# Patient Record
Sex: MALE | Race: WHITE | NOT HISPANIC OR LATINO | Employment: OTHER | URBAN - METROPOLITAN AREA
[De-identification: names, ages, dates, MRNs, and addresses within clinical notes are randomized per-mention and may not be internally consistent; named-entity substitution may affect disease eponyms.]

---

## 2018-05-18 DIAGNOSIS — E11.9 DM (DIABETES MELLITUS): ICD-10-CM

## 2018-05-18 DIAGNOSIS — R80.9 PROTEINURIA: ICD-10-CM

## 2018-05-18 DIAGNOSIS — I10 HTN (HYPERTENSION): ICD-10-CM

## 2018-05-18 DIAGNOSIS — I50.9 HEART FAILURE: ICD-10-CM

## 2018-05-18 DIAGNOSIS — I25.10 CAD IN NATIVE ARTERY: ICD-10-CM

## 2018-05-18 DIAGNOSIS — N17.9 AKI (ACUTE KIDNEY INJURY): ICD-10-CM

## 2018-05-18 DIAGNOSIS — N18.30 CKD (CHRONIC KIDNEY DISEASE), STAGE III: ICD-10-CM

## 2018-05-18 DIAGNOSIS — R35.89 FREQUENCY OF URINATION AND POLYURIA: ICD-10-CM

## 2018-05-18 DIAGNOSIS — E86.0 DEHYDRATION: ICD-10-CM

## 2018-05-18 DIAGNOSIS — R35.0 FREQUENCY OF URINATION AND POLYURIA: ICD-10-CM

## 2018-05-18 DIAGNOSIS — M19.90 DJD (DEGENERATIVE JOINT DISEASE): Primary | ICD-10-CM

## 2018-05-25 ENCOUNTER — HOSPITAL ENCOUNTER (OUTPATIENT)
Dept: RADIOLOGY | Facility: HOSPITAL | Age: 73
Discharge: HOME | End: 2018-05-25
Attending: INTERNAL MEDICINE
Payer: MEDICARE

## 2018-05-25 DIAGNOSIS — I50.9 HEART FAILURE: ICD-10-CM

## 2018-05-25 DIAGNOSIS — N18.30 CKD (CHRONIC KIDNEY DISEASE), STAGE III: ICD-10-CM

## 2018-05-25 DIAGNOSIS — E86.0 DEHYDRATION: ICD-10-CM

## 2018-05-25 DIAGNOSIS — I25.10 CAD IN NATIVE ARTERY: ICD-10-CM

## 2018-05-25 DIAGNOSIS — I10 HTN (HYPERTENSION): ICD-10-CM

## 2018-05-25 DIAGNOSIS — N17.9 AKI (ACUTE KIDNEY INJURY): ICD-10-CM

## 2018-05-25 DIAGNOSIS — R80.9 PROTEINURIA: ICD-10-CM

## 2018-05-25 DIAGNOSIS — E11.9 DM (DIABETES MELLITUS): ICD-10-CM

## 2018-05-25 DIAGNOSIS — M19.90 DJD (DEGENERATIVE JOINT DISEASE): ICD-10-CM

## 2018-05-25 DIAGNOSIS — R35.89 FREQUENCY OF URINATION AND POLYURIA: ICD-10-CM

## 2018-05-25 DIAGNOSIS — R35.0 FREQUENCY OF URINATION AND POLYURIA: ICD-10-CM

## 2018-05-25 PROCEDURE — 76857 US EXAM PELVIC LIMITED: CPT | Mod: TC

## 2018-05-25 PROCEDURE — 76857 US EXAM PELVIC LIMITED: CPT | Mod: 26,,, | Performed by: RADIOLOGY

## 2019-01-04 ENCOUNTER — TELEPHONE (OUTPATIENT)
Dept: PRIMARY CARE CLINIC | Facility: CLINIC | Age: 74
End: 2019-01-04

## 2019-01-04 NOTE — TELEPHONE ENCOUNTER
Nurse called and left message to please arrive for 130pm, appointment moved up to 145. Please call if have any questions.

## 2019-01-07 ENCOUNTER — TELEPHONE (OUTPATIENT)
Dept: PRIMARY CARE CLINIC | Facility: CLINIC | Age: 74
End: 2019-01-07

## 2019-01-07 NOTE — TELEPHONE ENCOUNTER
----- Message from Izzy Rodriguez sent at 1/7/2019 11:24 AM CST -----  Contact: Cholo robles/[facility] [phone]  He is requesting an appt with you for tomorrow morning.  He has a UTI and is trying to prevent going to the ER.  Please call.  Thank you!

## 2019-01-07 NOTE — TELEPHONE ENCOUNTER
Appointment made to see Ailyn on Wednesday. Cholo notified to bring the patient to the ER if he gets any worse.

## 2019-01-21 ENCOUNTER — TELEPHONE (OUTPATIENT)
Dept: PRIMARY CARE CLINIC | Facility: CLINIC | Age: 74
End: 2019-01-21

## 2019-01-21 ENCOUNTER — OFFICE VISIT (OUTPATIENT)
Dept: PRIMARY CARE CLINIC | Facility: CLINIC | Age: 74
End: 2019-01-21
Payer: MEDICARE

## 2019-01-21 VITALS
WEIGHT: 158 LBS | RESPIRATION RATE: 18 BRPM | TEMPERATURE: 98 F | HEIGHT: 68 IN | BODY MASS INDEX: 23.95 KG/M2 | SYSTOLIC BLOOD PRESSURE: 76 MMHG | OXYGEN SATURATION: 98 % | DIASTOLIC BLOOD PRESSURE: 50 MMHG | HEART RATE: 97 BPM

## 2019-01-21 DIAGNOSIS — N13.8 BENIGN PROSTATIC HYPERPLASIA WITH URINARY OBSTRUCTION: ICD-10-CM

## 2019-01-21 DIAGNOSIS — E11.22 TYPE 2 DIABETES MELLITUS WITH STAGE 3 CHRONIC KIDNEY DISEASE, WITH LONG-TERM CURRENT USE OF INSULIN: ICD-10-CM

## 2019-01-21 DIAGNOSIS — E78.5 HYPERLIPIDEMIA, UNSPECIFIED HYPERLIPIDEMIA TYPE: ICD-10-CM

## 2019-01-21 DIAGNOSIS — Z79.4 TYPE 2 DIABETES MELLITUS WITH STAGE 3 CHRONIC KIDNEY DISEASE, WITH LONG-TERM CURRENT USE OF INSULIN: ICD-10-CM

## 2019-01-21 DIAGNOSIS — R19.7 DIARRHEA, UNSPECIFIED TYPE: Primary | ICD-10-CM

## 2019-01-21 DIAGNOSIS — Z91.199 HISTORY OF NONCOMPLIANCE WITH MEDICAL TREATMENT: ICD-10-CM

## 2019-01-21 DIAGNOSIS — Z97.8 CHRONIC INDWELLING FOLEY CATHETER: ICD-10-CM

## 2019-01-21 DIAGNOSIS — G89.21 CHRONIC PAIN DUE TO TRAUMA: ICD-10-CM

## 2019-01-21 DIAGNOSIS — I73.9 PAD (PERIPHERAL ARTERY DISEASE): ICD-10-CM

## 2019-01-21 DIAGNOSIS — N40.1 BENIGN PROSTATIC HYPERPLASIA WITH URINARY OBSTRUCTION: ICD-10-CM

## 2019-01-21 DIAGNOSIS — F32.1 CURRENT MODERATE EPISODE OF MAJOR DEPRESSIVE DISORDER WITHOUT PRIOR EPISODE: ICD-10-CM

## 2019-01-21 DIAGNOSIS — N18.30 TYPE 2 DIABETES MELLITUS WITH STAGE 3 CHRONIC KIDNEY DISEASE, WITH LONG-TERM CURRENT USE OF INSULIN: ICD-10-CM

## 2019-01-21 DIAGNOSIS — S98.111A AMPUTATED GREAT TOE OF RIGHT FOOT: ICD-10-CM

## 2019-01-21 DIAGNOSIS — Z89.511 S/P BKA (BELOW KNEE AMPUTATION), RIGHT: ICD-10-CM

## 2019-01-21 DIAGNOSIS — N18.30 CHRONIC RENAL DISEASE, STAGE III: ICD-10-CM

## 2019-01-21 DIAGNOSIS — I25.10 CORONARY ARTERY DISEASE INVOLVING NATIVE CORONARY ARTERY OF NATIVE HEART, ANGINA PRESENCE UNSPECIFIED: ICD-10-CM

## 2019-01-21 DIAGNOSIS — F03.90 DEMENTIA WITHOUT BEHAVIORAL DISTURBANCE, UNSPECIFIED DEMENTIA TYPE: ICD-10-CM

## 2019-01-21 DIAGNOSIS — Z87.891 FORMER SMOKER: ICD-10-CM

## 2019-01-21 PROCEDURE — 99213 OFFICE O/P EST LOW 20 MIN: CPT | Mod: PBBFAC,PN | Performed by: FAMILY MEDICINE

## 2019-01-21 PROCEDURE — 99999 PR PBB SHADOW E&M-EST. PATIENT-LVL III: CPT | Mod: PBBFAC,,, | Performed by: FAMILY MEDICINE

## 2019-01-21 PROCEDURE — 99204 PR OFFICE/OUTPT VISIT, NEW, LEVL IV, 45-59 MIN: ICD-10-PCS | Mod: S$PBB,,, | Performed by: FAMILY MEDICINE

## 2019-01-21 PROCEDURE — 99204 OFFICE O/P NEW MOD 45 MIN: CPT | Mod: S$PBB,,, | Performed by: FAMILY MEDICINE

## 2019-01-21 PROCEDURE — 99999 PR PBB SHADOW E&M-EST. PATIENT-LVL III: ICD-10-PCS | Mod: PBBFAC,,, | Performed by: FAMILY MEDICINE

## 2019-01-21 RX ORDER — TAMSULOSIN HYDROCHLORIDE 0.4 MG/1
0.4 CAPSULE ORAL DAILY
Qty: 90 CAPSULE | Refills: 1 | Status: SHIPPED | OUTPATIENT
Start: 2019-01-21 | End: 2019-04-29 | Stop reason: SDUPTHER

## 2019-01-21 RX ORDER — PRASUGREL 10 MG/1
10 TABLET, FILM COATED ORAL DAILY
COMMUNITY
End: 2019-01-21 | Stop reason: SDUPTHER

## 2019-01-21 RX ORDER — FINASTERIDE 5 MG/1
5 TABLET, FILM COATED ORAL DAILY
Qty: 90 TABLET | Refills: 1 | Status: SHIPPED | OUTPATIENT
Start: 2019-01-21 | End: 2019-05-06 | Stop reason: SDUPTHER

## 2019-01-21 RX ORDER — PRAVASTATIN SODIUM 40 MG/1
40 TABLET ORAL DAILY
COMMUNITY
End: 2019-01-21 | Stop reason: SDUPTHER

## 2019-01-21 RX ORDER — TAMSULOSIN HYDROCHLORIDE 0.4 MG/1
0.4 CAPSULE ORAL DAILY
COMMUNITY
End: 2019-01-21 | Stop reason: SDUPTHER

## 2019-01-21 RX ORDER — ASPIRIN 81 MG/1
81 TABLET ORAL DAILY
Qty: 90 TABLET | Refills: 1 | Status: SHIPPED | OUTPATIENT
Start: 2019-01-21 | End: 2021-02-26 | Stop reason: SDUPTHER

## 2019-01-21 RX ORDER — CYCLOBENZAPRINE HCL 5 MG
10 TABLET ORAL NIGHTLY
COMMUNITY
End: 2019-05-22

## 2019-01-21 RX ORDER — NYSTATIN 100000 U/G
CREAM TOPICAL 2 TIMES DAILY
Qty: 30 G | Refills: 2 | Status: SHIPPED | OUTPATIENT
Start: 2019-01-21 | End: 2020-08-12

## 2019-01-21 RX ORDER — FINASTERIDE 5 MG/1
5 TABLET, FILM COATED ORAL DAILY
COMMUNITY
End: 2019-01-21 | Stop reason: SDUPTHER

## 2019-01-21 RX ORDER — ASPIRIN 81 MG/1
81 TABLET ORAL DAILY
COMMUNITY
End: 2019-01-21 | Stop reason: SDUPTHER

## 2019-01-21 RX ORDER — QUETIAPINE FUMARATE 25 MG/1
1 TABLET, FILM COATED ORAL NIGHTLY
COMMUNITY
Start: 2018-12-14 | End: 2019-01-21 | Stop reason: SDUPTHER

## 2019-01-21 RX ORDER — DONEPEZIL HYDROCHLORIDE 5 MG/1
5 TABLET, FILM COATED ORAL NIGHTLY
Qty: 90 TABLET | Refills: 1 | Status: SHIPPED | OUTPATIENT
Start: 2019-01-21 | End: 2019-05-22

## 2019-01-21 RX ORDER — PRASUGREL 10 MG/1
10 TABLET, FILM COATED ORAL DAILY
Qty: 90 TABLET | Refills: 1 | Status: SHIPPED | OUTPATIENT
Start: 2019-01-21 | End: 2019-05-06 | Stop reason: SDUPTHER

## 2019-01-21 RX ORDER — DONEPEZIL HYDROCHLORIDE 5 MG/1
5 TABLET, FILM COATED ORAL NIGHTLY
COMMUNITY
End: 2019-01-21 | Stop reason: SDUPTHER

## 2019-01-21 RX ORDER — ONDANSETRON 4 MG/1
4 TABLET, FILM COATED ORAL EVERY 8 HOURS PRN
COMMUNITY
End: 2020-08-12

## 2019-01-21 RX ORDER — OXYCODONE HYDROCHLORIDE 30 MG/1
30 TABLET ORAL EVERY 8 HOURS PRN
COMMUNITY

## 2019-01-21 RX ORDER — CITALOPRAM 20 MG/1
10 TABLET, FILM COATED ORAL DAILY
COMMUNITY
End: 2019-01-21 | Stop reason: SDUPTHER

## 2019-01-21 RX ORDER — CITALOPRAM 20 MG/1
20 TABLET, FILM COATED ORAL DAILY
Qty: 90 TABLET | Refills: 1 | Status: SHIPPED | OUTPATIENT
Start: 2019-01-21 | End: 2019-05-06 | Stop reason: SDUPTHER

## 2019-01-21 RX ORDER — GABAPENTIN 100 MG/1
100 CAPSULE ORAL 2 TIMES DAILY
Qty: 180 CAPSULE | Refills: 1 | Status: SHIPPED | OUTPATIENT
Start: 2019-01-21 | End: 2019-05-22

## 2019-01-21 RX ORDER — QUETIAPINE FUMARATE 25 MG/1
25 TABLET, FILM COATED ORAL NIGHTLY
Qty: 90 TABLET | Refills: 1 | Status: SHIPPED | OUTPATIENT
Start: 2019-01-21 | End: 2019-03-14 | Stop reason: SDUPTHER

## 2019-01-21 RX ORDER — GABAPENTIN 100 MG/1
100 CAPSULE ORAL 2 TIMES DAILY
COMMUNITY
End: 2019-01-21 | Stop reason: SDUPTHER

## 2019-01-21 RX ORDER — PRAVASTATIN SODIUM 40 MG/1
40 TABLET ORAL DAILY
Qty: 90 TABLET | Refills: 1 | Status: SHIPPED | OUTPATIENT
Start: 2019-01-21 | End: 2019-05-06 | Stop reason: SDUPTHER

## 2019-01-21 NOTE — PROGRESS NOTES
Subjective:       Patient ID: Gavin Cohen is a 73 y.o. male.    Chief Complaint: Establish Care    Here to establish primary care services.  Already sees multiple specialist, including Nephrology, vascular surgery, Endocrinology, Infectious Disease, Cardiology and Pain Management.  He has been admitted to the hospital multiple times over the past several months with complications related to severe peripheral arterial disease. His daughter-in-law, who is now his primary caretaker and with whom he lives, reports that he has a long history of noncompliance.  She reports that his A1c used to be greater than 15, 8 with everyone, did not take medications as prescribed.  In any event, he started having issues with peripheral gangrene and had several amputations over the past few months, ultimately had a right below-the-knee amputation and amputation of the 1st and 2nd toes of his left foot with partial amputation of his 1st metatarsal, as well.  Has had numerous complications postoperatively, largely infectious.  Developed urinary obstruction due to BPH, was doing intermittent catheters for a while, but now has chronic indwelling Briones catheter that gets changed out every 30 days.  Has had a few admissions with recurrent UTIs with sepsis.  Daughter-in-law reports that he has some thick, white discharge under his foreskin.  Started on antidepressants a few months ago, still depressed, but no suicidality or hallucinations.  Only taking low-dose citalopram.  She reports that his blood pressure typically runs low, and he is very position dependent, gets significantly orthostatic when he sits up.  She reports that he has had diarrhea several times a day, follow smelling, watery, nonbloody, worried that it may be a complication from his multiple recent courses of IV antibiotics.      Review of Systems   Constitutional: Negative for fever.   HENT: Negative for trouble swallowing.    Eyes: Negative for visual disturbance.  "  Respiratory: Negative for shortness of breath.    Cardiovascular: Negative for chest pain.   Gastrointestinal: Positive for diarrhea. Negative for blood in stool.   Genitourinary: Positive for difficulty urinating. Negative for hematuria.   Musculoskeletal: Positive for gait problem.   Skin: Negative for wound.   Neurological: Positive for dizziness.   Hematological: Bruises/bleeds easily.   Psychiatric/Behavioral: Positive for dysphoric mood. Negative for agitation, self-injury and suicidal ideas.       Objective:      Vitals:    01/21/19 0951   BP: (!) 76/50   BP Location: Left arm   Patient Position: Sitting   BP Method: Large (Automatic)   Pulse: 97   Resp: 18   Temp: 98 °F (36.7 °C)   TempSrc: Oral   SpO2: 98%   Weight: 71.7 kg (158 lb)   Height: 5' 8" (1.727 m)     Physical Exam   Constitutional: He is oriented to person, place, and time. He appears well-developed and well-nourished.   HENT:   Head: Normocephalic and atraumatic.   Eyes: EOM are normal.   Neck: No JVD present.   Cardiovascular: Normal rate, regular rhythm and normal heart sounds.   Pulmonary/Chest: Effort normal and breath sounds normal.   Abdominal: Soft. He exhibits no distension.   Genitourinary:   Genitourinary Comments: Briones catheter in place   Musculoskeletal: He exhibits deformity. He exhibits no edema.   Right BKA, stump well healed, no erythema or skin breakdown.  Partial left foot amputation with well-healed incision medially, no wound breakdown, fluctuance or drainage   Neurological: He is alert and oriented to person, place, and time.   Skin: Skin is warm and dry.   Psychiatric: He has a normal mood and affect. His behavior is normal.   Nursing note and vitals reviewed.      Assessment:       1. Diarrhea, unspecified type    2. PAD (peripheral artery disease)    3. Coronary artery disease involving native coronary artery of native heart, angina presence unspecified    4. S/P BKA (below knee amputation), right    5. Benign " prostatic hyperplasia with urinary obstruction    6. Chronic indwelling Briones catheter    7. Chronic renal disease, stage III    8. Amputated great toe of right foot    9. Type 2 diabetes mellitus with stage 3 chronic kidney disease, with long-term current use of insulin    10. Former smoker    11. History of noncompliance with medical treatment    12. Chronic pain due to trauma    13. Current moderate episode of major depressive disorder without prior episode    14. Dementia without behavioral disturbance, unspecified dementia type    15. Hyperlipidemia, unspecified hyperlipidemia type        Plan:       Diarrhea, unspecified type  -     Clostridium difficile EIA; Future; Expected date: 01/21/2019    PAD (peripheral artery disease)  Follow-up with vascular surgery as scheduled  Coronary artery disease involving native coronary artery of native heart, angina presence unspecified  -     prasugrel (EFFIENT) 10 mg Tab; Take 1 tablet (10 mg total) by mouth once daily.  Dispense: 90 tablet; Refill: 1  -     aspirin (ECOTRIN) 81 MG EC tablet; Take 1 tablet (81 mg total) by mouth once daily.  Dispense: 90 tablet; Refill: 1  Followed by Cardiology  S/P BKA (below knee amputation), right  Stable  Benign prostatic hyperplasia with urinary obstruction  -     tamsulosin (FLOMAX) 0.4 mg Cap; Take 1 capsule (0.4 mg total) by mouth once daily.  Dispense: 90 capsule; Refill: 1  -     finasteride (PROSCAR) 5 mg tablet; Take 1 tablet (5 mg total) by mouth once daily.  Dispense: 90 tablet; Refill: 1  Follow-up with Urology  Chronic indwelling Briones catheter  As above  Chronic renal disease, stage III  Follow-up with Nephrology, need to get recent lab results  Amputated great toe of right foot    Type 2 diabetes mellitus with stage 3 chronic kidney disease, with long-term current use of insulin  -      DIABETES FOOT EXAM  Follow-up with endocrinology  Former smoker    History of noncompliance with medical treatment  Currently taking  all meds as prescribed, as his daughter-in-law is now managing his care  Chronic pain due to trauma  -     gabapentin (NEURONTIN) 100 MG capsule; Take 1 capsule (100 mg total) by mouth 2 (two) times daily.  Dispense: 180 capsule; Refill: 1  Follow-up with pain management  Current moderate episode of major depressive disorder without prior episode  -     citalopram (CELEXA) 20 MG tablet; Take 1 tablet (20 mg total) by mouth once daily.  Dispense: 90 tablet; Refill: 1  Increase SSRI  Dementia without behavioral disturbance, unspecified dementia type  -     QUEtiapine (SEROQUEL) 25 MG Tab; Take 1 tablet (25 mg total) by mouth every evening.  Dispense: 90 tablet; Refill: 1  -     donepezil (ARICEPT) 5 MG tablet; Take 1 tablet (5 mg total) by mouth every evening.  Dispense: 90 tablet; Refill: 1  Stable  Hyperlipidemia, unspecified hyperlipidemia type  -     pravastatin (PRAVACHOL) 40 MG tablet; Take 1 tablet (40 mg total) by mouth once daily.  Dispense: 90 tablet; Refill: 1    Other orders  -     nystatin (MYCOSTATIN) cream; Apply topically 2 (two) times daily.  Dispense: 30 g; Refill: 2         Medication List           Accurate as of 1/21/19 11:34 AM. If you have any questions, ask your nurse or doctor.               START taking these medications    nystatin cream  Commonly known as:  MYCOSTATIN  Apply topically 2 (two) times daily.  Started by:  Juve Patiño MD        CHANGE how you take these medications    citalopram 20 MG tablet  Commonly known as:  CELEXA  Take 1 tablet (20 mg total) by mouth once daily.  What changed:  how much to take  Changed by:  Juve Patiño MD        CONTINUE taking these medications    aspirin 81 MG EC tablet  Commonly known as:  ECOTRIN  Take 1 tablet (81 mg total) by mouth once daily.     cyclobenzaprine 5 MG tablet  Commonly known as:  FLEXERIL     donepezil 5 MG tablet  Commonly known as:  ARICEPT  Take 1 tablet (5 mg total) by mouth every evening.     finasteride 5 mg  tablet  Commonly known as:  PROSCAR  Take 1 tablet (5 mg total) by mouth once daily.     gabapentin 100 MG capsule  Commonly known as:  NEURONTIN  Take 1 capsule (100 mg total) by mouth 2 (two) times daily.     LEVEMIR FLEXTOUCH U-100 INSULN SUBQ     ondansetron 4 MG tablet  Commonly known as:  ZOFRAN     oxyCODONE 30 MG Tab  Commonly known as:  ROXICODONE     prasugrel 10 mg Tab  Commonly known as:  EFFIENT  Take 1 tablet (10 mg total) by mouth once daily.     pravastatin 40 MG tablet  Commonly known as:  PRAVACHOL  Take 1 tablet (40 mg total) by mouth once daily.     QUEtiapine 25 MG Tab  Commonly known as:  SEROQUEL  Take 1 tablet (25 mg total) by mouth every evening.     tamsulosin 0.4 mg Cap  Commonly known as:  FLOMAX  Take 1 capsule (0.4 mg total) by mouth once daily.           Where to Get Your Medications      These medications were sent to [facility] - [place] - [address]  [address] [place] [address]    Phone:  [phone]   · aspirin 81 MG EC tablet  · citalopram 20 MG tablet  · donepezil 5 MG tablet  · finasteride 5 mg tablet  · gabapentin 100 MG capsule  · nystatin cream  · prasugrel 10 mg Tab  · pravastatin 40 MG tablet  · QUEtiapine 25 MG Tab  · tamsulosin 0.4 mg Cap

## 2019-01-21 NOTE — TELEPHONE ENCOUNTER
Patients daughter states [facility] did not get the Nystatin or Aricpet rx. I told her I would call them and figure it out.

## 2019-01-21 NOTE — TELEPHONE ENCOUNTER
----- Message from Irene Glaser RT sent at 1/21/2019  1:20 PM CST -----  Contact: Misty,Care Taker / Daughter in Law, [phone]   Misty,Care Taker / Daughter in Law, [phone], checking status of medication refills, did not remember the names, thanks.

## 2019-01-29 ENCOUNTER — TELEPHONE (OUTPATIENT)
Dept: ADMINISTRATIVE | Facility: CLINIC | Age: 74
End: 2019-01-29

## 2019-02-01 ENCOUNTER — TELEPHONE (OUTPATIENT)
Dept: PRIMARY CARE CLINIC | Facility: CLINIC | Age: 74
End: 2019-02-01

## 2019-02-01 RX ORDER — SULFAMETHOXAZOLE AND TRIMETHOPRIM 400; 80 MG/1; MG/1
1 TABLET ORAL 2 TIMES DAILY
Qty: 14 TABLET | Refills: 0 | Status: SHIPPED | OUTPATIENT
Start: 2019-02-01 | End: 2019-02-08

## 2019-02-01 NOTE — TELEPHONE ENCOUNTER
Looks like he has another infection.  Prescription for Bactrim (regular, not DS) sent to the pharmacy.

## 2019-02-01 NOTE — TELEPHONE ENCOUNTER
Home health nurse, Cholo, says the patients urine is cloudy, dark, foul smelling and has a lot of sediments in it. He dropped off a urine to the hospital and wants to know if you can review it and send medications if appropriate

## 2019-02-01 NOTE — TELEPHONE ENCOUNTER
Cholo and patients daughter-in-law (caretaker) notified and states understanding. She says she is worried because when he gets UTIs he normally has to get IV antibiotics. I told her that right now Dr. Patiño has sent PO meds but if the patient gets any worse, they can bring him to the ED. She states understanding

## 2019-02-01 NOTE — TELEPHONE ENCOUNTER
----- Message from Erica Fried sent at 2/1/2019 12:43 PM CST -----  Contact: Cholo with [facility] phone [phone]  Cholo with [facility] phone [phone], Patient has a possible urinary tract infection. Please call him to discuss care. Thanks.

## 2019-03-14 DIAGNOSIS — F03.90 DEMENTIA WITHOUT BEHAVIORAL DISTURBANCE, UNSPECIFIED DEMENTIA TYPE: ICD-10-CM

## 2019-03-14 RX ORDER — QUETIAPINE FUMARATE 25 MG/1
TABLET, FILM COATED ORAL
Qty: 90 TABLET | Refills: 1 | Status: SHIPPED | OUTPATIENT
Start: 2019-03-14 | End: 2019-05-22 | Stop reason: SDUPTHER

## 2019-03-20 DIAGNOSIS — Z12.11 COLON CANCER SCREENING: ICD-10-CM

## 2019-03-20 DIAGNOSIS — E11.9 TYPE 2 DIABETES MELLITUS WITHOUT COMPLICATION: ICD-10-CM

## 2019-04-24 ENCOUNTER — TELEPHONE (OUTPATIENT)
Dept: PRIMARY CARE CLINIC | Facility: CLINIC | Age: 74
End: 2019-04-24

## 2019-04-29 DIAGNOSIS — N13.8 BENIGN PROSTATIC HYPERPLASIA WITH URINARY OBSTRUCTION: ICD-10-CM

## 2019-04-29 DIAGNOSIS — N40.1 BENIGN PROSTATIC HYPERPLASIA WITH URINARY OBSTRUCTION: ICD-10-CM

## 2019-04-29 RX ORDER — TAMSULOSIN HYDROCHLORIDE 0.4 MG/1
CAPSULE ORAL
Qty: 90 CAPSULE | Refills: 3 | Status: SHIPPED | OUTPATIENT
Start: 2019-04-29 | End: 2019-05-22 | Stop reason: SDUPTHER

## 2019-04-30 ENCOUNTER — TELEPHONE (OUTPATIENT)
Dept: ADMINISTRATIVE | Facility: HOSPITAL | Age: 74
End: 2019-04-30

## 2019-04-30 NOTE — TELEPHONE ENCOUNTER
Placed call to patient to attempt to reschedule appt w/ Dr. Patiño for 04/30.     Left message on machine for return call.

## 2019-05-06 DIAGNOSIS — N13.8 BENIGN PROSTATIC HYPERPLASIA WITH URINARY OBSTRUCTION: ICD-10-CM

## 2019-05-06 DIAGNOSIS — N40.1 BENIGN PROSTATIC HYPERPLASIA WITH URINARY OBSTRUCTION: ICD-10-CM

## 2019-05-06 DIAGNOSIS — I25.10 CORONARY ARTERY DISEASE INVOLVING NATIVE CORONARY ARTERY OF NATIVE HEART, ANGINA PRESENCE UNSPECIFIED: ICD-10-CM

## 2019-05-06 DIAGNOSIS — E78.5 HYPERLIPIDEMIA, UNSPECIFIED HYPERLIPIDEMIA TYPE: ICD-10-CM

## 2019-05-06 DIAGNOSIS — F32.1 CURRENT MODERATE EPISODE OF MAJOR DEPRESSIVE DISORDER WITHOUT PRIOR EPISODE: ICD-10-CM

## 2019-05-06 RX ORDER — PRASUGREL 10 MG/1
TABLET, FILM COATED ORAL
Qty: 90 TABLET | Refills: 1 | Status: SHIPPED | OUTPATIENT
Start: 2019-05-06 | End: 2019-05-22 | Stop reason: SDUPTHER

## 2019-05-06 RX ORDER — PRAVASTATIN SODIUM 40 MG/1
TABLET ORAL
Qty: 90 TABLET | Refills: 1 | Status: SHIPPED | OUTPATIENT
Start: 2019-05-06 | End: 2019-05-22 | Stop reason: SDUPTHER

## 2019-05-06 RX ORDER — CITALOPRAM 20 MG/1
TABLET, FILM COATED ORAL
Qty: 90 TABLET | Refills: 1 | Status: SHIPPED | OUTPATIENT
Start: 2019-05-06 | End: 2019-05-22 | Stop reason: SDUPTHER

## 2019-05-06 RX ORDER — FINASTERIDE 5 MG/1
TABLET, FILM COATED ORAL
Qty: 90 TABLET | Refills: 1 | Status: SHIPPED | OUTPATIENT
Start: 2019-05-06 | End: 2019-05-22 | Stop reason: SDUPTHER

## 2019-05-22 ENCOUNTER — OFFICE VISIT (OUTPATIENT)
Dept: PRIMARY CARE CLINIC | Facility: CLINIC | Age: 74
End: 2019-05-22
Payer: MEDICARE

## 2019-05-22 VITALS
HEART RATE: 102 BPM | SYSTOLIC BLOOD PRESSURE: 110 MMHG | TEMPERATURE: 98 F | DIASTOLIC BLOOD PRESSURE: 69 MMHG | OXYGEN SATURATION: 97 % | RESPIRATION RATE: 16 BRPM

## 2019-05-22 DIAGNOSIS — J20.9 ACUTE BRONCHITIS, UNSPECIFIED ORGANISM: ICD-10-CM

## 2019-05-22 DIAGNOSIS — Z79.4 TYPE 2 DIABETES MELLITUS WITH STAGE 3 CHRONIC KIDNEY DISEASE, WITH LONG-TERM CURRENT USE OF INSULIN: Primary | ICD-10-CM

## 2019-05-22 DIAGNOSIS — N40.1 BENIGN PROSTATIC HYPERPLASIA WITH URINARY OBSTRUCTION: ICD-10-CM

## 2019-05-22 DIAGNOSIS — N18.30 TYPE 2 DIABETES MELLITUS WITH STAGE 3 CHRONIC KIDNEY DISEASE, WITH LONG-TERM CURRENT USE OF INSULIN: Primary | ICD-10-CM

## 2019-05-22 DIAGNOSIS — I25.10 CORONARY ARTERY DISEASE INVOLVING NATIVE CORONARY ARTERY OF NATIVE HEART, ANGINA PRESENCE UNSPECIFIED: ICD-10-CM

## 2019-05-22 DIAGNOSIS — E11.22 TYPE 2 DIABETES MELLITUS WITH STAGE 3 CHRONIC KIDNEY DISEASE, WITH LONG-TERM CURRENT USE OF INSULIN: Primary | ICD-10-CM

## 2019-05-22 DIAGNOSIS — F03.90 DEMENTIA WITHOUT BEHAVIORAL DISTURBANCE, UNSPECIFIED DEMENTIA TYPE: ICD-10-CM

## 2019-05-22 DIAGNOSIS — F32.1 CURRENT MODERATE EPISODE OF MAJOR DEPRESSIVE DISORDER WITHOUT PRIOR EPISODE: ICD-10-CM

## 2019-05-22 DIAGNOSIS — N13.8 BENIGN PROSTATIC HYPERPLASIA WITH URINARY OBSTRUCTION: ICD-10-CM

## 2019-05-22 DIAGNOSIS — Z89.511 S/P BKA (BELOW KNEE AMPUTATION), RIGHT: ICD-10-CM

## 2019-05-22 DIAGNOSIS — E78.5 HYPERLIPIDEMIA, UNSPECIFIED HYPERLIPIDEMIA TYPE: ICD-10-CM

## 2019-05-22 PROCEDURE — 99214 PR OFFICE/OUTPT VISIT, EST, LEVL IV, 30-39 MIN: ICD-10-PCS | Mod: S$PBB,,, | Performed by: FAMILY MEDICINE

## 2019-05-22 PROCEDURE — 99214 OFFICE O/P EST MOD 30 MIN: CPT | Mod: S$PBB,,, | Performed by: FAMILY MEDICINE

## 2019-05-22 PROCEDURE — 99213 OFFICE O/P EST LOW 20 MIN: CPT | Mod: PBBFAC,PN | Performed by: FAMILY MEDICINE

## 2019-05-22 PROCEDURE — 99999 PR PBB SHADOW E&M-EST. PATIENT-LVL III: ICD-10-PCS | Mod: PBBFAC,,, | Performed by: FAMILY MEDICINE

## 2019-05-22 PROCEDURE — 99999 PR PBB SHADOW E&M-EST. PATIENT-LVL III: CPT | Mod: PBBFAC,,, | Performed by: FAMILY MEDICINE

## 2019-05-22 RX ORDER — FINASTERIDE 5 MG/1
TABLET, FILM COATED ORAL
Qty: 90 TABLET | Refills: 1 | Status: SHIPPED | OUTPATIENT
Start: 2019-05-22 | End: 2020-08-12

## 2019-05-22 RX ORDER — QUETIAPINE FUMARATE 25 MG/1
25 TABLET, FILM COATED ORAL NIGHTLY
Qty: 90 TABLET | Refills: 1 | Status: SHIPPED | OUTPATIENT
Start: 2019-05-22 | End: 2020-08-12

## 2019-05-22 RX ORDER — CITALOPRAM 20 MG/1
20 TABLET, FILM COATED ORAL DAILY
Qty: 90 TABLET | Refills: 1 | Status: SHIPPED | OUTPATIENT
Start: 2019-05-22 | End: 2020-08-12

## 2019-05-22 RX ORDER — PRASUGREL 10 MG/1
TABLET, FILM COATED ORAL
Qty: 90 TABLET | Refills: 1 | Status: SHIPPED | OUTPATIENT
Start: 2019-05-22 | End: 2019-11-18 | Stop reason: SDUPTHER

## 2019-05-22 RX ORDER — ALBUTEROL SULFATE 90 UG/1
2 AEROSOL, METERED RESPIRATORY (INHALATION) EVERY 4 HOURS PRN
Qty: 18 G | Refills: 1 | Status: SHIPPED | OUTPATIENT
Start: 2019-05-22 | End: 2021-02-26 | Stop reason: SDUPTHER

## 2019-05-22 RX ORDER — TAMSULOSIN HYDROCHLORIDE 0.4 MG/1
CAPSULE ORAL
Qty: 90 CAPSULE | Refills: 1 | Status: SHIPPED | OUTPATIENT
Start: 2019-05-22 | End: 2019-10-28 | Stop reason: SDUPTHER

## 2019-05-22 RX ORDER — PRAVASTATIN SODIUM 40 MG/1
40 TABLET ORAL DAILY
Qty: 90 TABLET | Refills: 1 | Status: SHIPPED | OUTPATIENT
Start: 2019-05-22 | End: 2020-08-12

## 2019-05-22 NOTE — PROGRESS NOTES
Subjective:       Patient ID: Gavin Cohen Sr. is a 73 y.o. male.    Chief Complaint: Follow-up    Here for routine follow-up, requesting medication refills.  Says his blood sugars have been much better, mostly in the low 100s.  Compliant with medications.  Complains of cough for the past 4-5 days, productive of white sputum.  Wheezing at times.  Denies shortness of breath or fever.  Recently got right lower extremity prosthesis, starting to learn to walk with it    Review of Systems   Constitutional: Negative for fever.   HENT: Negative for trouble swallowing.    Eyes: Negative for visual disturbance.   Respiratory: Positive for cough. Negative for shortness of breath.    Cardiovascular: Negative for chest pain.   Gastrointestinal: Negative for blood in stool.   Genitourinary: Positive for difficulty urinating.   Musculoskeletal: Positive for gait problem.   Skin: Negative for rash and wound.   Psychiatric/Behavioral: Negative for agitation.       Objective:      Vitals:    05/22/19 1523   BP: 110/69   BP Location: Right arm   Patient Position: Sitting   BP Method: Medium (Automatic)   Pulse: 102   Resp: 16   Temp: 98.2 °F (36.8 °C)   TempSrc: Oral   SpO2: 97%     Physical Exam   Constitutional: He is oriented to person, place, and time. He appears well-developed and well-nourished.   HENT:   Head: Normocephalic and atraumatic.   Eyes: EOM are normal.   Neck: No JVD present.   Cardiovascular: Normal rate, regular rhythm and normal heart sounds.   Pulmonary/Chest: Effort normal. No respiratory distress. He has no decreased breath sounds. He has wheezes. He has no rhonchi. He has no rales.   Abdominal: Soft. He exhibits no distension.   Genitourinary:   Genitourinary Comments: Briones catheter in place   Musculoskeletal: He exhibits deformity. He exhibits no edema.   Right BKA, stump well healed, no erythema or skin breakdown.  Partial left foot amputation with well-healed incision medially, no wound breakdown,  fluctuance or drainage   Neurological: He is alert and oriented to person, place, and time.   Skin: Skin is warm and dry.   Psychiatric: He has a normal mood and affect. His behavior is normal.   Nursing note and vitals reviewed.      Assessment:       1. Type 2 diabetes mellitus with stage 3 chronic kidney disease, with long-term current use of insulin    2. Benign prostatic hyperplasia with urinary obstruction    3. Current moderate episode of major depressive disorder without prior episode    4. Coronary artery disease involving native coronary artery of native heart, angina presence unspecified    5. Dementia without behavioral disturbance, unspecified dementia type    6. Hyperlipidemia, unspecified hyperlipidemia type    7. S/P BKA (below knee amputation), right    8. Acute bronchitis, unspecified organism        Plan:       Type 2 diabetes mellitus with stage 3 chronic kidney disease, with long-term current use of insulin  -     insulin detemir U-100 (LEVEMIR FLEXTOUCH U-100 INSULN) 100 unit/mL (3 mL) SubQ InPn pen; Inject 20 Units into the skin once daily.  Dispense: 9 mL; Refill: 5    Benign prostatic hyperplasia with urinary obstruction  -     finasteride (PROSCAR) 5 mg tablet; TAKE ONE TABLET BY MOUTH DAILY FOR PROSTATE  Dispense: 90 tablet; Refill: 1  -     tamsulosin (FLOMAX) 0.4 mg Cap; TAKE ONE CAPSULE BY MOUTH DAILY FOR PROSTATE  Dispense: 90 capsule; Refill: 1    Current moderate episode of major depressive disorder without prior episode  -     citalopram (CELEXA) 20 MG tablet; Take 1 tablet (20 mg total) by mouth once daily.  Dispense: 90 tablet; Refill: 1    Coronary artery disease involving native coronary artery of native heart, angina presence unspecified  -     prasugrel (EFFIENT) 10 mg Tab; TAKE ONE TABLET BY MOUTH DAILY FOR heart  Dispense: 90 tablet; Refill: 1    Dementia without behavioral disturbance, unspecified dementia type  -     QUEtiapine (SEROQUEL) 25 MG Tab; Take 1 tablet (25 mg  total) by mouth every evening.  Dispense: 90 tablet; Refill: 1    Hyperlipidemia, unspecified hyperlipidemia type  -     pravastatin (PRAVACHOL) 40 MG tablet; Take 1 tablet (40 mg total) by mouth once daily.  Dispense: 90 tablet; Refill: 1    S/P BKA (below knee amputation), right    Acute bronchitis, unspecified organism  -     albuterol (VENTOLIN HFA) 90 mcg/actuation inhaler; Inhale 2 puffs into the lungs every 4 (four) hours as needed. Rescue  Dispense: 18 g; Refill: 1    Meds all refilled.  Needs updated labs.  Return to clinic for any new or worsening symptoms  Medication List with Changes/Refills   New Medications    ALBUTEROL (VENTOLIN HFA) 90 MCG/ACTUATION INHALER    Inhale 2 puffs into the lungs every 4 (four) hours as needed. Rescue   Current Medications    ASPIRIN (ECOTRIN) 81 MG EC TABLET    Take 1 tablet (81 mg total) by mouth once daily.    NYSTATIN (MYCOSTATIN) CREAM    Apply topically 2 (two) times daily.    ONDANSETRON (ZOFRAN) 4 MG TABLET    Take 4 mg by mouth every 8 (eight) hours as needed for Nausea.    OXYCODONE (ROXICODONE) 30 MG TAB    Take 30 mg by mouth every 8 (eight) hours as needed.   Changed and/or Refilled Medications    Modified Medication Previous Medication    CITALOPRAM (CELEXA) 20 MG TABLET citalopram (CELEXA) 20 MG tablet       Take 1 tablet (20 mg total) by mouth once daily.    TAKE ONE TABLET BY MOUTH DAILY    FINASTERIDE (PROSCAR) 5 MG TABLET finasteride (PROSCAR) 5 mg tablet       TAKE ONE TABLET BY MOUTH DAILY FOR PROSTATE    TAKE ONE TABLET BY MOUTH DAILY FOR PROSTATE    INSULIN DETEMIR U-100 (LEVEMIR FLEXTOUCH U-100 INSULN) 100 UNIT/ML (3 ML) SUBQ INPN PEN insulin detemir (LEVEMIR FLEXTOUCH U-100 INSULN SUBQ)       Inject 20 Units into the skin once daily.    Inject into the skin. 12 units if sugar is over 100.   5 units at lunch if sugar is over 100.    PRASUGREL (EFFIENT) 10 MG TAB prasugrel (EFFIENT) 10 mg Tab       TAKE ONE TABLET BY MOUTH DAILY FOR heart    TAKE ONE  TABLET BY MOUTH DAILY FOR heart    PRAVASTATIN (PRAVACHOL) 40 MG TABLET pravastatin (PRAVACHOL) 40 MG tablet       Take 1 tablet (40 mg total) by mouth once daily.    TAKE ONE TABLET BY MOUTH DAILY for cholesterol    QUETIAPINE (SEROQUEL) 25 MG TAB QUEtiapine (SEROQUEL) 25 MG Tab       Take 1 tablet (25 mg total) by mouth every evening.    TAKE ONE TABLET BY MOUTH EVERY EVENING    TAMSULOSIN (FLOMAX) 0.4 MG CAP tamsulosin (FLOMAX) 0.4 mg Cap       TAKE ONE CAPSULE BY MOUTH DAILY FOR PROSTATE    TAKE ONE CAPSULE BY MOUTH DAILY FOR PROSTATE   Discontinued Medications    CYCLOBENZAPRINE (FLEXERIL) 5 MG TABLET    Take 10 mg by mouth nightly.    DONEPEZIL (ARICEPT) 5 MG TABLET    Take 1 tablet (5 mg total) by mouth every evening.    GABAPENTIN (NEURONTIN) 100 MG CAPSULE    Take 1 capsule (100 mg total) by mouth 2 (two) times daily.

## 2019-05-23 ENCOUNTER — LAB VISIT (OUTPATIENT)
Dept: LAB | Facility: HOSPITAL | Age: 74
End: 2019-05-23
Attending: FAMILY MEDICINE
Payer: MEDICARE

## 2019-05-23 DIAGNOSIS — Z12.11 COLON CANCER SCREENING: ICD-10-CM

## 2019-05-23 DIAGNOSIS — G89.21 CHRONIC PAIN DUE TO TRAUMA: ICD-10-CM

## 2019-05-23 PROCEDURE — 82274 ASSAY TEST FOR BLOOD FECAL: CPT

## 2019-05-24 RX ORDER — GABAPENTIN 100 MG/1
CAPSULE ORAL
Qty: 180 CAPSULE | Refills: 1 | Status: SHIPPED | OUTPATIENT
Start: 2019-05-24 | End: 2019-11-26 | Stop reason: SDUPTHER

## 2019-05-30 LAB — HEMOCCULT STL QL IA: NEGATIVE

## 2019-05-31 ENCOUNTER — TELEPHONE (OUTPATIENT)
Dept: PRIMARY CARE CLINIC | Facility: CLINIC | Age: 74
End: 2019-05-31

## 2019-05-31 NOTE — TELEPHONE ENCOUNTER
----- Message from Erica Do sent at 5/31/2019  9:58 AM CDT -----  Type:  Patient Returning Call    Who Called:  Misty Alejandro Left Message for Patient:     Does the patient know what this is regarding?:     Best Call Back Number:  [phone] (home)     Additional Information:

## 2019-07-29 DIAGNOSIS — F03.90 DEMENTIA WITHOUT BEHAVIORAL DISTURBANCE, UNSPECIFIED DEMENTIA TYPE: ICD-10-CM

## 2019-07-29 RX ORDER — QUETIAPINE FUMARATE 25 MG/1
TABLET, FILM COATED ORAL
Qty: 90 TABLET | Refills: 1 | Status: SHIPPED | OUTPATIENT
Start: 2019-07-29 | End: 2020-08-12 | Stop reason: SDUPTHER

## 2019-10-03 ENCOUNTER — TELEPHONE (OUTPATIENT)
Dept: PRIMARY CARE CLINIC | Facility: CLINIC | Age: 74
End: 2019-10-03

## 2019-10-03 DIAGNOSIS — E11.22 TYPE 2 DIABETES MELLITUS WITH STAGE 3 CHRONIC KIDNEY DISEASE, WITH LONG-TERM CURRENT USE OF INSULIN: ICD-10-CM

## 2019-10-03 DIAGNOSIS — E78.5 HYPERLIPIDEMIA, UNSPECIFIED HYPERLIPIDEMIA TYPE: ICD-10-CM

## 2019-10-03 DIAGNOSIS — I73.9 PAD (PERIPHERAL ARTERY DISEASE): Primary | ICD-10-CM

## 2019-10-03 DIAGNOSIS — N18.30 TYPE 2 DIABETES MELLITUS WITH STAGE 3 CHRONIC KIDNEY DISEASE, WITH LONG-TERM CURRENT USE OF INSULIN: ICD-10-CM

## 2019-10-03 DIAGNOSIS — Z79.4 TYPE 2 DIABETES MELLITUS WITH STAGE 3 CHRONIC KIDNEY DISEASE, WITH LONG-TERM CURRENT USE OF INSULIN: ICD-10-CM

## 2019-10-03 DIAGNOSIS — N18.30 CHRONIC RENAL DISEASE, STAGE III: ICD-10-CM

## 2019-10-28 DIAGNOSIS — N40.1 BENIGN PROSTATIC HYPERPLASIA WITH URINARY OBSTRUCTION: ICD-10-CM

## 2019-10-28 DIAGNOSIS — N13.8 BENIGN PROSTATIC HYPERPLASIA WITH URINARY OBSTRUCTION: ICD-10-CM

## 2019-10-28 DIAGNOSIS — F32.1 CURRENT MODERATE EPISODE OF MAJOR DEPRESSIVE DISORDER WITHOUT PRIOR EPISODE: ICD-10-CM

## 2019-10-28 RX ORDER — TAMSULOSIN HYDROCHLORIDE 0.4 MG/1
CAPSULE ORAL
Qty: 90 CAPSULE | Refills: 1 | Status: SHIPPED | OUTPATIENT
Start: 2019-10-28 | End: 2020-05-11

## 2019-10-28 RX ORDER — CITALOPRAM 20 MG/1
TABLET, FILM COATED ORAL
Qty: 90 TABLET | Refills: 1 | Status: SHIPPED | OUTPATIENT
Start: 2019-10-28 | End: 2020-05-11

## 2019-11-11 DIAGNOSIS — N13.8 BENIGN PROSTATIC HYPERPLASIA WITH URINARY OBSTRUCTION: ICD-10-CM

## 2019-11-11 DIAGNOSIS — N40.1 BENIGN PROSTATIC HYPERPLASIA WITH URINARY OBSTRUCTION: ICD-10-CM

## 2019-11-11 RX ORDER — FINASTERIDE 5 MG/1
TABLET, FILM COATED ORAL
Qty: 90 TABLET | Refills: 1 | Status: SHIPPED | OUTPATIENT
Start: 2019-11-11 | End: 2020-06-08

## 2019-11-18 DIAGNOSIS — I25.10 CORONARY ARTERY DISEASE INVOLVING NATIVE CORONARY ARTERY OF NATIVE HEART, ANGINA PRESENCE UNSPECIFIED: ICD-10-CM

## 2019-11-18 RX ORDER — PRASUGREL 10 MG/1
TABLET, FILM COATED ORAL
Qty: 90 TABLET | Refills: 1 | Status: SHIPPED | OUTPATIENT
Start: 2019-11-18 | End: 2020-05-26

## 2019-11-26 DIAGNOSIS — E78.5 HYPERLIPIDEMIA, UNSPECIFIED HYPERLIPIDEMIA TYPE: ICD-10-CM

## 2019-11-26 DIAGNOSIS — G89.21 CHRONIC PAIN DUE TO TRAUMA: ICD-10-CM

## 2019-11-26 RX ORDER — GABAPENTIN 100 MG/1
CAPSULE ORAL
Qty: 180 CAPSULE | Refills: 1 | Status: SHIPPED | OUTPATIENT
Start: 2019-11-26 | End: 2020-06-08

## 2019-11-26 RX ORDER — PRAVASTATIN SODIUM 40 MG/1
TABLET ORAL
Qty: 90 TABLET | Refills: 1 | Status: SHIPPED | OUTPATIENT
Start: 2019-11-26 | End: 2020-05-26

## 2019-11-29 RX ORDER — ALCOHOL ANTISEPTIC PADS
PADS, MEDICATED (EA) TOPICAL
Qty: 100 EACH | Refills: 5 | Status: SHIPPED | OUTPATIENT
Start: 2019-11-29 | End: 2024-01-16 | Stop reason: SDUPTHER

## 2019-12-10 DIAGNOSIS — N18.30 TYPE 2 DIABETES MELLITUS WITH STAGE 3 CHRONIC KIDNEY DISEASE, WITH LONG-TERM CURRENT USE OF INSULIN: ICD-10-CM

## 2019-12-10 DIAGNOSIS — Z79.4 TYPE 2 DIABETES MELLITUS WITH STAGE 3 CHRONIC KIDNEY DISEASE, WITH LONG-TERM CURRENT USE OF INSULIN: ICD-10-CM

## 2019-12-10 DIAGNOSIS — E11.22 TYPE 2 DIABETES MELLITUS WITH STAGE 3 CHRONIC KIDNEY DISEASE, WITH LONG-TERM CURRENT USE OF INSULIN: ICD-10-CM

## 2019-12-10 RX ORDER — INSULIN DETEMIR 100 [IU]/ML
INJECTION, SOLUTION SUBCUTANEOUS
Qty: 15 ML | Refills: 0 | Status: SHIPPED | OUTPATIENT
Start: 2019-12-10 | End: 2020-02-10

## 2020-02-10 DIAGNOSIS — N18.30 TYPE 2 DIABETES MELLITUS WITH STAGE 3 CHRONIC KIDNEY DISEASE, WITH LONG-TERM CURRENT USE OF INSULIN: ICD-10-CM

## 2020-02-10 DIAGNOSIS — Z79.4 TYPE 2 DIABETES MELLITUS WITH STAGE 3 CHRONIC KIDNEY DISEASE, WITH LONG-TERM CURRENT USE OF INSULIN: ICD-10-CM

## 2020-02-10 DIAGNOSIS — E11.22 TYPE 2 DIABETES MELLITUS WITH STAGE 3 CHRONIC KIDNEY DISEASE, WITH LONG-TERM CURRENT USE OF INSULIN: ICD-10-CM

## 2020-02-10 RX ORDER — INSULIN DETEMIR 100 [IU]/ML
INJECTION, SOLUTION SUBCUTANEOUS
Qty: 15 ML | Refills: 0 | Status: SHIPPED | OUTPATIENT
Start: 2020-02-10 | End: 2020-03-19

## 2020-03-19 DIAGNOSIS — Z79.4 TYPE 2 DIABETES MELLITUS WITH STAGE 3 CHRONIC KIDNEY DISEASE, WITH LONG-TERM CURRENT USE OF INSULIN: ICD-10-CM

## 2020-03-19 DIAGNOSIS — E11.22 TYPE 2 DIABETES MELLITUS WITH STAGE 3 CHRONIC KIDNEY DISEASE, WITH LONG-TERM CURRENT USE OF INSULIN: ICD-10-CM

## 2020-03-19 DIAGNOSIS — N18.30 TYPE 2 DIABETES MELLITUS WITH STAGE 3 CHRONIC KIDNEY DISEASE, WITH LONG-TERM CURRENT USE OF INSULIN: ICD-10-CM

## 2020-03-19 RX ORDER — INSULIN DETEMIR 100 [IU]/ML
INJECTION, SOLUTION SUBCUTANEOUS
Qty: 15 ML | Refills: 1 | Status: SHIPPED | OUTPATIENT
Start: 2020-03-19 | End: 2020-04-16

## 2020-04-16 DIAGNOSIS — N18.30 TYPE 2 DIABETES MELLITUS WITH STAGE 3 CHRONIC KIDNEY DISEASE, WITH LONG-TERM CURRENT USE OF INSULIN: ICD-10-CM

## 2020-04-16 DIAGNOSIS — E11.22 TYPE 2 DIABETES MELLITUS WITH STAGE 3 CHRONIC KIDNEY DISEASE, WITH LONG-TERM CURRENT USE OF INSULIN: ICD-10-CM

## 2020-04-16 DIAGNOSIS — Z79.4 TYPE 2 DIABETES MELLITUS WITH STAGE 3 CHRONIC KIDNEY DISEASE, WITH LONG-TERM CURRENT USE OF INSULIN: ICD-10-CM

## 2020-04-16 RX ORDER — INSULIN DETEMIR 100 [IU]/ML
INJECTION, SOLUTION SUBCUTANEOUS
Qty: 15 ML | Refills: 0 | Status: SHIPPED | OUTPATIENT
Start: 2020-04-16 | End: 2020-06-23 | Stop reason: SDUPTHER

## 2020-05-11 DIAGNOSIS — N13.8 BENIGN PROSTATIC HYPERPLASIA WITH URINARY OBSTRUCTION: ICD-10-CM

## 2020-05-11 DIAGNOSIS — F32.1 CURRENT MODERATE EPISODE OF MAJOR DEPRESSIVE DISORDER WITHOUT PRIOR EPISODE: ICD-10-CM

## 2020-05-11 DIAGNOSIS — N40.1 BENIGN PROSTATIC HYPERPLASIA WITH URINARY OBSTRUCTION: ICD-10-CM

## 2020-05-11 RX ORDER — CITALOPRAM 20 MG/1
TABLET, FILM COATED ORAL
Qty: 90 TABLET | Refills: 1 | Status: SHIPPED | OUTPATIENT
Start: 2020-05-11 | End: 2020-08-12 | Stop reason: SDUPTHER

## 2020-05-11 RX ORDER — TAMSULOSIN HYDROCHLORIDE 0.4 MG/1
CAPSULE ORAL
Qty: 90 CAPSULE | Refills: 1 | Status: SHIPPED | OUTPATIENT
Start: 2020-05-11 | End: 2020-08-12 | Stop reason: SDUPTHER

## 2020-05-26 DIAGNOSIS — E78.5 HYPERLIPIDEMIA, UNSPECIFIED HYPERLIPIDEMIA TYPE: ICD-10-CM

## 2020-05-26 DIAGNOSIS — I25.10 CORONARY ARTERY DISEASE INVOLVING NATIVE CORONARY ARTERY OF NATIVE HEART, ANGINA PRESENCE UNSPECIFIED: ICD-10-CM

## 2020-05-26 RX ORDER — PRAVASTATIN SODIUM 40 MG/1
TABLET ORAL
Qty: 90 TABLET | Refills: 0 | Status: SHIPPED | OUTPATIENT
Start: 2020-05-26 | End: 2020-08-12 | Stop reason: SDUPTHER

## 2020-05-26 RX ORDER — PRASUGREL 10 MG/1
TABLET, FILM COATED ORAL
Qty: 90 TABLET | Refills: 0 | Status: SHIPPED | OUTPATIENT
Start: 2020-05-26 | End: 2020-09-16 | Stop reason: ALTCHOICE

## 2020-06-11 DIAGNOSIS — Z12.11 COLON CANCER SCREENING: ICD-10-CM

## 2020-06-23 DIAGNOSIS — Z79.4 TYPE 2 DIABETES MELLITUS WITH STAGE 3 CHRONIC KIDNEY DISEASE, WITH LONG-TERM CURRENT USE OF INSULIN: ICD-10-CM

## 2020-06-23 DIAGNOSIS — N18.30 TYPE 2 DIABETES MELLITUS WITH STAGE 3 CHRONIC KIDNEY DISEASE, WITH LONG-TERM CURRENT USE OF INSULIN: ICD-10-CM

## 2020-06-23 DIAGNOSIS — E11.22 TYPE 2 DIABETES MELLITUS WITH STAGE 3 CHRONIC KIDNEY DISEASE, WITH LONG-TERM CURRENT USE OF INSULIN: ICD-10-CM

## 2020-06-23 NOTE — TELEPHONE ENCOUNTER
----- Message from Grover Maier sent at 6/23/2020  4:22 PM CDT -----  Requesting an RX refill or new RX.  Is this a refill or new RX:  Refill  RX name and strength: LEVEMIR FLEXTOUCH U-100 INSULN 100 unit/mL (3 mL) InPn pen  Directions (copy/paste from chart):    Is this a 30 day or 90 day RX:    Local pharmacy or mail order pharmacy:    Pharmacy name and phone # [facility] - [place], [place] - [address] [place] [address], [address] [phone] (Phone) [phone] (Fax)      Comments:

## 2020-06-24 RX ORDER — INSULIN DETEMIR 100 [IU]/ML
INJECTION, SOLUTION SUBCUTANEOUS
Qty: 15 ML | Refills: 0 | Status: SHIPPED | OUTPATIENT
Start: 2020-06-24 | End: 2020-07-14

## 2020-06-24 NOTE — TELEPHONE ENCOUNTER
Patients daughter was informed that the physicians were in clinic but I did have Dr. Leal do it. She was notified it was sent to the pharmacy

## 2020-06-24 NOTE — TELEPHONE ENCOUNTER
----- Message from Krista Mclain sent at 6/24/2020  9:31 AM CDT -----  Contact: self/[phone]  Requesting an RX refill or new RX.  Is this a refill or new RX:  Refill  RX name and strength: LEVEMIR FLEXTOUCH U-100 INSULN 100 unit/mL (3 mL) InPn pen  Directions (copy/paste from chart):    Is this a 30 day or 90 day RX:    Local pharmacy or mail order pharmacy:    Pharmacy name and phone # [facility] - [place], [place] - [address] [place] [address], [address] [phone] (Phone) [phone] (Fax)        Comments:pt has not had his insulin for today.     Please advise

## 2020-06-24 NOTE — TELEPHONE ENCOUNTER
"----- Message from Kamran Teresa sent at 6/24/2020 11:18 AM CDT -----  Regarding: Rx refill  Contact: Daughter Carolyn [phone]  Patient would like to get Rx advice.    Pharmacy name and phone #:  [facility] - [place], [place] - [address], [address] [phone] (Phone) [phone] (Fax)    Comments: Daughter calling regarding the status of the "[facility]" for patient call to update if has been sent. Is all out of Rx, lost half of weight due to being diabetic, would like to be sent ASAP, concerned. Daughter Carolyn [phone]    Please call an advise  Thank you    "

## 2020-06-24 NOTE — TELEPHONE ENCOUNTER
Let patient's daughter know as soon as medication is signed we will call and let her know. Stated understanding

## 2020-06-24 NOTE — TELEPHONE ENCOUNTER
----- Message from Estee Tate sent at 6/24/2020  2:12 PM CDT -----  Regarding: Pts daughter Ms. James Mobile [phone]  Ms. Leon is calling in regards to her saying that she called an put in two urgent messages on today for her fathers LEVEMIR FLEXTOUCH U-100 INSULN 100 unit/mL (3 mL) InPn pen but it never was sent to..    [facility] [address], [address]  Ms. Leon said also called Thursday trying to get get her fathers script filled and now her dad is out of medication and she would like for you to give her a call please.       [facility] phone # [phone] Fax#[phone]

## 2020-07-13 DIAGNOSIS — Z79.4 TYPE 2 DIABETES MELLITUS WITH STAGE 3 CHRONIC KIDNEY DISEASE, WITH LONG-TERM CURRENT USE OF INSULIN: ICD-10-CM

## 2020-07-13 DIAGNOSIS — E11.22 TYPE 2 DIABETES MELLITUS WITH STAGE 3 CHRONIC KIDNEY DISEASE, WITH LONG-TERM CURRENT USE OF INSULIN: ICD-10-CM

## 2020-07-13 DIAGNOSIS — N18.30 TYPE 2 DIABETES MELLITUS WITH STAGE 3 CHRONIC KIDNEY DISEASE, WITH LONG-TERM CURRENT USE OF INSULIN: ICD-10-CM

## 2020-07-14 RX ORDER — INSULIN DETEMIR 100 [IU]/ML
INJECTION, SOLUTION SUBCUTANEOUS
Qty: 15 ML | Refills: 0 | Status: SHIPPED | OUTPATIENT
Start: 2020-07-14 | End: 2020-08-12 | Stop reason: SDUPTHER

## 2020-07-17 DIAGNOSIS — Z71.89 COMPLEX CARE COORDINATION: ICD-10-CM

## 2020-08-12 ENCOUNTER — OFFICE VISIT (OUTPATIENT)
Dept: PRIMARY CARE CLINIC | Facility: CLINIC | Age: 75
End: 2020-08-12
Payer: MEDICARE

## 2020-08-12 DIAGNOSIS — E11.22 TYPE 2 DIABETES MELLITUS WITH STAGE 3 CHRONIC KIDNEY DISEASE, WITH LONG-TERM CURRENT USE OF INSULIN: ICD-10-CM

## 2020-08-12 DIAGNOSIS — I25.10 CORONARY ARTERY DISEASE INVOLVING NATIVE CORONARY ARTERY OF NATIVE HEART, ANGINA PRESENCE UNSPECIFIED: ICD-10-CM

## 2020-08-12 DIAGNOSIS — Z79.4 TYPE 2 DIABETES MELLITUS WITH STAGE 3 CHRONIC KIDNEY DISEASE, WITH LONG-TERM CURRENT USE OF INSULIN: ICD-10-CM

## 2020-08-12 DIAGNOSIS — N18.30 TYPE 2 DIABETES MELLITUS WITH STAGE 3 CHRONIC KIDNEY DISEASE, WITH LONG-TERM CURRENT USE OF INSULIN: ICD-10-CM

## 2020-08-12 DIAGNOSIS — F03.90 DEMENTIA WITHOUT BEHAVIORAL DISTURBANCE, UNSPECIFIED DEMENTIA TYPE: ICD-10-CM

## 2020-08-12 DIAGNOSIS — E78.5 HYPERLIPIDEMIA, UNSPECIFIED HYPERLIPIDEMIA TYPE: ICD-10-CM

## 2020-08-12 DIAGNOSIS — N18.30 CHRONIC RENAL DISEASE, STAGE III: Primary | ICD-10-CM

## 2020-08-12 DIAGNOSIS — N13.8 BENIGN PROSTATIC HYPERPLASIA WITH URINARY OBSTRUCTION: ICD-10-CM

## 2020-08-12 DIAGNOSIS — G89.21 CHRONIC PAIN DUE TO TRAUMA: ICD-10-CM

## 2020-08-12 DIAGNOSIS — N40.1 BENIGN PROSTATIC HYPERPLASIA WITH URINARY OBSTRUCTION: ICD-10-CM

## 2020-08-12 DIAGNOSIS — F32.1 CURRENT MODERATE EPISODE OF MAJOR DEPRESSIVE DISORDER WITHOUT PRIOR EPISODE: ICD-10-CM

## 2020-08-12 PROCEDURE — 99442 PR PHYSICIAN TELEPHONE EVALUATION 11-20 MIN: CPT | Mod: 95,,, | Performed by: NURSE PRACTITIONER

## 2020-08-12 PROCEDURE — 99442 PR PHYSICIAN TELEPHONE EVALUATION 11-20 MIN: ICD-10-PCS | Mod: 95,,, | Performed by: NURSE PRACTITIONER

## 2020-08-12 RX ORDER — PRAVASTATIN SODIUM 40 MG/1
40 TABLET ORAL DAILY
Qty: 90 TABLET | Refills: 1 | Status: SHIPPED | OUTPATIENT
Start: 2020-08-12 | End: 2020-09-16 | Stop reason: SDUPTHER

## 2020-08-12 RX ORDER — INSULIN DETEMIR 100 [IU]/ML
INJECTION, SOLUTION SUBCUTANEOUS
Qty: 15 ML | Refills: 1 | Status: SHIPPED | OUTPATIENT
Start: 2020-08-12 | End: 2020-10-21 | Stop reason: SDUPTHER

## 2020-08-12 RX ORDER — TAMSULOSIN HYDROCHLORIDE 0.4 MG/1
CAPSULE ORAL
Qty: 90 CAPSULE | Refills: 1 | Status: SHIPPED | OUTPATIENT
Start: 2020-08-12 | End: 2021-02-26 | Stop reason: SDUPTHER

## 2020-08-12 RX ORDER — QUETIAPINE FUMARATE 50 MG/1
50 TABLET, FILM COATED ORAL NIGHTLY
Qty: 90 TABLET | Refills: 1 | Status: SHIPPED | OUTPATIENT
Start: 2020-08-12 | End: 2020-09-16

## 2020-08-12 RX ORDER — FINASTERIDE 5 MG/1
5 TABLET, FILM COATED ORAL DAILY
Qty: 90 TABLET | Refills: 1 | Status: SHIPPED | OUTPATIENT
Start: 2020-08-12 | End: 2021-02-26 | Stop reason: SDUPTHER

## 2020-08-12 RX ORDER — GABAPENTIN 100 MG/1
CAPSULE ORAL
Qty: 180 CAPSULE | Refills: 1 | Status: SHIPPED | OUTPATIENT
Start: 2020-08-12 | End: 2021-02-26 | Stop reason: SDUPTHER

## 2020-08-12 RX ORDER — CITALOPRAM 20 MG/1
20 TABLET, FILM COATED ORAL DAILY
Qty: 90 TABLET | Refills: 1 | Status: SHIPPED | OUTPATIENT
Start: 2020-08-12 | End: 2020-11-13

## 2020-08-12 NOTE — PROGRESS NOTES
"Chief Complaint  Chief Complaint   Patient presents with    Medication Refill     The patient location is: home  The chief complaint leading to consultation is: medication question    Visit type: audio only    Face to Face time with patient: 20  20 minutes of total time spent on the encounter, which includes face to face time and non-face to face time preparing to see the patient (eg, review of tests), Obtaining and/or reviewing separately obtained history, Documenting clinical information in the electronic or other health record, Independently interpreting results (not separately reported) and communicating results to the patient/family/caregiver, or Care coordination (not separately reported).         Each patient to whom he or she provides medical services by telemedicine is:  (1) informed of the relationship between the physician and patient and the respective role of any other health care provider with respect to management of the patient; and (2) notified that he or she may decline to receive medical services by telemedicine and may withdraw from such care at any time.    Notes:     QIANA Cohen Sr. is a 74 y.o. male that presents for clarification of medications, medication refill. H/o dementia presents with his daughter via audio call to discuss medications. Medications generally managed by the patient. No recent lab work on file. Patient with transportation issues, daughter primary caregiver. Expresses concern with coming into lab with COVID epidemic.     DM II - patient reports compliance with medication regimen including levemir 20 mg QHS with adequate control of blood sugar. Currently checking BGL at home and states that they are running "fine" though he cannot recall any numbers. No current A1C on file. No recent eye exam. Does complain of lower extremity neuropathy s/s generally controlled by gabapentin.     HLD - compliant with pravachol 40 mg daily without noted side effects. No recent lipid " panel on file. No chest pain or palpitations.     Lab Results   Component Value Date    ALT 22 04/12/2018    AST 30 04/12/2018     04/12/2018    K 4.9 04/12/2018     04/12/2018    CREATININE 2.2 (H) 04/12/2018    BUN 35 (H) 04/12/2018    CO2 23 04/12/2018         PAST MEDICAL HISTORY:  Past Medical History:   Diagnosis Date    BPH (benign prostatic hyperplasia)     Dementia     Depression     Diabetes mellitus, type 2     Hyperlipidemia     Hypertension     Kidney failure     PAD (peripheral artery disease)        PAST SURGICAL HISTORY:  Past Surgical History:   Procedure Laterality Date    AMPUTATION, LOWER LIMB      CORONARY ANGIOPLASTY WITH STENT PLACEMENT         SOCIAL HISTORY:  Social History     Socioeconomic History    Marital status:      Spouse name: Not on file    Number of children: Not on file    Years of education: Not on file    Highest education level: Not on file   Occupational History    Not on file   Social Needs    Financial resource strain: Not on file    Food insecurity     Worry: Not on file     Inability: Not on file    Transportation needs     Medical: Not on file     Non-medical: Not on file   Tobacco Use    Smoking status: Never Smoker    Smokeless tobacco: Never Used   Substance and Sexual Activity    Alcohol use: No     Frequency: Never    Drug use: No    Sexual activity: Not on file   Lifestyle    Physical activity     Days per week: Not on file     Minutes per session: Not on file    Stress: Not on file   Relationships    Social connections     Talks on phone: Not on file     Gets together: Not on file     Attends [employer] service: Not on file     Active member of club or organization: Not on file     Attends meetings of clubs or organizations: Not on file     Relationship status: Not on file   Other Topics Concern    Not on file   Social History Narrative    Not on file       FAMILY HISTORY:  Family History   Problem Relation Age of  "Onset    Diabetes Mother        ALLERGIES AND MEDICATIONS: updated and reviewed.  Review of patient's allergies indicates:   Allergen Reactions    Cephalosporins Other (See Comments)    Iodine and iodide containing products     Levofloxacin Swelling    Penicillins Swelling     Current Outpatient Medications   Medication Sig Dispense Refill    albuterol (VENTOLIN HFA) 90 mcg/actuation inhaler Inhale 2 puffs into the lungs every 4 (four) hours as needed. Rescue 18 g 1    aspirin (ECOTRIN) 81 MG EC tablet Take 1 tablet (81 mg total) by mouth once daily. 90 tablet 1    citalopram (CELEXA) 20 MG tablet Take 1 tablet (20 mg total) by mouth once daily. 90 tablet 1    COMFORT EZ PEN NEEDLES 33 gauge x 3/16" Ndle USE WITH BASAGLAR 100 each 5    finasteride (PROSCAR) 5 mg tablet Take 1 tablet (5 mg total) by mouth once daily. 90 tablet 1    gabapentin (NEURONTIN) 100 MG capsule TAKE ONE CAPSULE BY MOUTH TWICE DAILY FOR NEUROPATHY-(NERVE PAIN)- 180 capsule 1    insulin detemir U-100 (LEVEMIR FLEXTOUCH U-100 INSULN) 100 unit/mL (3 mL) InPn pen Inject 20 units subcutaneously nightly. 15 mL 1    oxyCODONE (ROXICODONE) 30 MG Tab Take 30 mg by mouth every 8 (eight) hours as needed.      prasugreL (EFFIENT) 10 mg Tab TAKE ONE TABLET BY MOUTH DAILY FOR THE HEART 90 tablet 0    pravastatin (PRAVACHOL) 40 MG tablet Take 1 tablet (40 mg total) by mouth once daily. 90 tablet 1    QUEtiapine (SEROQUEL) 50 MG tablet Take 1 tablet (50 mg total) by mouth every evening. 90 tablet 1    tamsulosin (FLOMAX) 0.4 mg Cap TAKE ONE CAPSULE BY MOUTH EVERY DAY FOR PROSTATE 90 capsule 1     No current facility-administered medications for this visit.          ROS  Review of Systems   Constitutional: Positive for fatigue. Negative for chills and fever.   HENT: Negative for ear pain, postnasal drip and sinus pain.    Respiratory: Negative for cough and shortness of breath.    Cardiovascular: Negative for chest pain.   Gastrointestinal: " Negative for diarrhea, nausea and vomiting.   Neurological: Positive for numbness.   Psychiatric/Behavioral: Positive for agitation, confusion and sleep disturbance.           PHYSICAL EXAM  There were no vitals filed for this visit. There is no height or weight on file to calculate BMI.            Physical Exam    Audio only.     Health Maintenance       Date Due Completion Date    Hepatitis C Screening 1945 ---    Urine Microalbumin 12/04/1954 ---    TETANUS VACCINE 12/04/1962 ---    Shingles Vaccine (1 of 2) 12/04/1994 ---    Lipid Panel 03/16/2018 3/16/2017    Hemoglobin A1c 01/09/2019 7/9/2018    Eye Exam 07/19/2019 7/19/2018    Pneumococcal Vaccine (65+ Low/Medium Risk) (2 of 2 - PPSV23) 12/14/2019 12/14/2018    Foot Exam 01/21/2020 1/21/2019    Colorectal Cancer Screening 05/23/2020 5/23/2019    Influenza Vaccine (1) 09/01/2020 12/14/2018    High Dose Statin 05/26/2021 5/26/2020    Aspirin/Antiplatelet Therapy 05/26/2021 5/26/2020            Assessment & Plan    Problem List Items Addressed This Visit        Unprioritized    Coronary artery disease involving native coronary artery of native heart    Relevant Orders    Ambulatory referral/consult to Cardiology    Benign prostatic hyperplasia with urinary obstruction    Relevant Medications    tamsulosin (FLOMAX) 0.4 mg Cap    finasteride (PROSCAR) 5 mg tablet    Chronic renal disease, stage III - Primary    Type 2 diabetes mellitus with stage 3 chronic kidney disease, with long-term current use of insulin    Relevant Medications    insulin detemir U-100 (LEVEMIR FLEXTOUCH U-100 INSULN) 100 unit/mL (3 mL) InPn pen    Chronic pain due to trauma    Relevant Medications    gabapentin (NEURONTIN) 100 MG capsule    Current moderate episode of major depressive disorder without prior episode    Relevant Medications    citalopram (CELEXA) 20 MG tablet    Dementia without behavioral disturbance    Relevant Medications    QUEtiapine (SEROQUEL) 50 MG tablet     "Hyperlipidemia    Relevant Medications    pravastatin (PRAVACHOL) 40 MG tablet      Encouraged patient to schedule lab work which has been ordered by his PCP.     Follow-up: No follow-ups on file.    Ailyn Wise    Medication List with Changes/Refills   Current Medications    ALBUTEROL (VENTOLIN HFA) 90 MCG/ACTUATION INHALER    Inhale 2 puffs into the lungs every 4 (four) hours as needed. Rescue    ASPIRIN (ECOTRIN) 81 MG EC TABLET    Take 1 tablet (81 mg total) by mouth once daily.    COMFORT EZ PEN NEEDLES 33 GAUGE X 3/16" NDLE    USE WITH BASAGLAR    OXYCODONE (ROXICODONE) 30 MG TAB    Take 30 mg by mouth every 8 (eight) hours as needed.    PRASUGREL (EFFIENT) 10 MG TAB    TAKE ONE TABLET BY MOUTH DAILY FOR THE HEART   Changed and/or Refilled Medications    Modified Medication Previous Medication    CITALOPRAM (CELEXA) 20 MG TABLET citalopram (CELEXA) 20 MG tablet       Take 1 tablet (20 mg total) by mouth once daily.    TAKE ONE TABLET BY MOUTH EVERY DAY    FINASTERIDE (PROSCAR) 5 MG TABLET finasteride (PROSCAR) 5 mg tablet       Take 1 tablet (5 mg total) by mouth once daily.    TAKE ONE TABLET BY MOUTH EVERY DAY FOR PROSTATE    GABAPENTIN (NEURONTIN) 100 MG CAPSULE gabapentin (NEURONTIN) 100 MG capsule       TAKE ONE CAPSULE BY MOUTH TWICE DAILY FOR NEUROPATHY-(NERVE PAIN)-    TAKE ONE CAPSULE BY MOUTH TWICE DAILY FOR NEUROPATHY-(NERVE PAIN)-    INSULIN DETEMIR U-100 (LEVEMIR FLEXTOUCH U-100 INSULN) 100 UNIT/ML (3 ML) INPN PEN LEVEMIR FLEXTOUCH U-100 INSULN 100 unit/mL (3 mL) InPn pen       Inject 20 units subcutaneously nightly.    inject 20 units subcutaneously DAILY    PRAVASTATIN (PRAVACHOL) 40 MG TABLET pravastatin (PRAVACHOL) 40 MG tablet       Take 1 tablet (40 mg total) by mouth once daily.    TAKE ONE TABLET BY MOUTH DAILY FOR CHOLESTEROL    QUETIAPINE (SEROQUEL) 50 MG TABLET QUEtiapine (SEROQUEL) 25 MG Tab       Take 1 tablet (50 mg total) by mouth every evening.    TAKE ONE TABLET BY MOUTH EVERY " EVENING TO help with memory    TAMSULOSIN (FLOMAX) 0.4 MG CAP tamsulosin (FLOMAX) 0.4 mg Cap       TAKE ONE CAPSULE BY MOUTH EVERY DAY FOR PROSTATE    TAKE ONE CAPSULE BY MOUTH EVERY DAY FOR PROSTATE   Discontinued Medications    CITALOPRAM (CELEXA) 20 MG TABLET    Take 1 tablet (20 mg total) by mouth once daily.    FINASTERIDE (PROSCAR) 5 MG TABLET    TAKE ONE TABLET BY MOUTH DAILY FOR PROSTATE    NYSTATIN (MYCOSTATIN) CREAM    Apply topically 2 (two) times daily.    ONDANSETRON (ZOFRAN) 4 MG TABLET    Take 4 mg by mouth every 8 (eight) hours as needed for Nausea.    PRAVASTATIN (PRAVACHOL) 40 MG TABLET    Take 1 tablet (40 mg total) by mouth once daily.    QUETIAPINE (SEROQUEL) 25 MG TAB    Take 1 tablet (25 mg total) by mouth every evening.

## 2020-08-23 ENCOUNTER — PATIENT OUTREACH (OUTPATIENT)
Dept: ADMINISTRATIVE | Facility: OTHER | Age: 75
End: 2020-08-23

## 2020-08-23 DIAGNOSIS — E11.9 TYPE 2 DIABETES MELLITUS WITHOUT COMPLICATION, UNSPECIFIED WHETHER LONG TERM INSULIN USE: Primary | ICD-10-CM

## 2020-08-23 NOTE — PROGRESS NOTES
LINKS immunization registry updated  Care Everywhere updated  Health Maintenance updated  Chart reviewed for overdue Proactive [facility] Encounters (CE) health maintenance testing (CRS, Breast Ca, Diabetic Eye Exam)   Orders entered:N/A

## 2020-09-16 ENCOUNTER — OFFICE VISIT (OUTPATIENT)
Dept: CARDIOLOGY | Facility: CLINIC | Age: 75
End: 2020-09-16
Payer: MEDICARE

## 2020-09-16 VITALS
OXYGEN SATURATION: 99 % | HEART RATE: 77 BPM | DIASTOLIC BLOOD PRESSURE: 69 MMHG | SYSTOLIC BLOOD PRESSURE: 113 MMHG | HEIGHT: 68 IN | BODY MASS INDEX: 24.02 KG/M2

## 2020-09-16 DIAGNOSIS — I45.10 RBBB: ICD-10-CM

## 2020-09-16 DIAGNOSIS — I73.9 PAD (PERIPHERAL ARTERY DISEASE): ICD-10-CM

## 2020-09-16 DIAGNOSIS — Z89.511 S/P BKA (BELOW KNEE AMPUTATION), RIGHT: ICD-10-CM

## 2020-09-16 DIAGNOSIS — I25.10 CORONARY ARTERY DISEASE INVOLVING NATIVE CORONARY ARTERY OF NATIVE HEART, ANGINA PRESENCE UNSPECIFIED: Primary | ICD-10-CM

## 2020-09-16 DIAGNOSIS — E78.5 HYPERLIPIDEMIA, UNSPECIFIED HYPERLIPIDEMIA TYPE: ICD-10-CM

## 2020-09-16 DIAGNOSIS — Z79.4 TYPE 2 DIABETES MELLITUS WITH STAGE 3 CHRONIC KIDNEY DISEASE, WITH LONG-TERM CURRENT USE OF INSULIN: ICD-10-CM

## 2020-09-16 DIAGNOSIS — E78.2 MIXED HYPERLIPIDEMIA: ICD-10-CM

## 2020-09-16 DIAGNOSIS — Z98.61 POST PTCA: ICD-10-CM

## 2020-09-16 DIAGNOSIS — E11.22 TYPE 2 DIABETES MELLITUS WITH STAGE 3 CHRONIC KIDNEY DISEASE, WITH LONG-TERM CURRENT USE OF INSULIN: ICD-10-CM

## 2020-09-16 DIAGNOSIS — I25.2 OLD MI (MYOCARDIAL INFARCTION): ICD-10-CM

## 2020-09-16 DIAGNOSIS — R06.09 DYSPNEA ON EXERTION: ICD-10-CM

## 2020-09-16 DIAGNOSIS — Z98.61 S/P PTCA (PERCUTANEOUS TRANSLUMINAL CORONARY ANGIOPLASTY): ICD-10-CM

## 2020-09-16 DIAGNOSIS — N18.4 CKD (CHRONIC KIDNEY DISEASE) STAGE 4, GFR 15-29 ML/MIN: ICD-10-CM

## 2020-09-16 DIAGNOSIS — N18.30 TYPE 2 DIABETES MELLITUS WITH STAGE 3 CHRONIC KIDNEY DISEASE, WITH LONG-TERM CURRENT USE OF INSULIN: ICD-10-CM

## 2020-09-16 PROCEDURE — 99205 PR OFFICE/OUTPT VISIT, NEW, LEVL V, 60-74 MIN: ICD-10-PCS | Mod: S$PBB,25,, | Performed by: INTERNAL MEDICINE

## 2020-09-16 PROCEDURE — 93010 EKG 12-LEAD: ICD-10-PCS | Mod: S$PBB,,, | Performed by: INTERNAL MEDICINE

## 2020-09-16 PROCEDURE — 99999 PR PBB SHADOW E&M-EST. PATIENT-LVL IV: CPT | Mod: PBBFAC,,, | Performed by: INTERNAL MEDICINE

## 2020-09-16 PROCEDURE — 99205 OFFICE O/P NEW HI 60 MIN: CPT | Mod: S$PBB,25,, | Performed by: INTERNAL MEDICINE

## 2020-09-16 PROCEDURE — 99999 PR PBB SHADOW E&M-EST. PATIENT-LVL IV: ICD-10-PCS | Mod: PBBFAC,,, | Performed by: INTERNAL MEDICINE

## 2020-09-16 PROCEDURE — 93010 ELECTROCARDIOGRAM REPORT: CPT | Mod: S$PBB,,, | Performed by: INTERNAL MEDICINE

## 2020-09-16 PROCEDURE — 93005 ELECTROCARDIOGRAM TRACING: CPT | Mod: PBBFAC,PN | Performed by: INTERNAL MEDICINE

## 2020-09-16 PROCEDURE — 99214 OFFICE O/P EST MOD 30 MIN: CPT | Mod: PBBFAC,PN,25 | Performed by: INTERNAL MEDICINE

## 2020-09-16 RX ORDER — PRAVASTATIN SODIUM 40 MG/1
40 TABLET ORAL DAILY
Qty: 90 TABLET | Refills: 3 | Status: SHIPPED | OUTPATIENT
Start: 2020-09-16 | End: 2021-02-26 | Stop reason: SDUPTHER

## 2020-09-16 RX ORDER — CARISOPRODOL 350 MG/1
TABLET ORAL
COMMUNITY
Start: 2020-09-10 | End: 2022-07-13

## 2020-09-16 RX ORDER — CLOPIDOGREL BISULFATE 75 MG/1
75 TABLET ORAL DAILY
Qty: 90 TABLET | Refills: 3 | Status: SHIPPED | OUTPATIENT
Start: 2020-09-16 | End: 2021-02-26 | Stop reason: SDUPTHER

## 2020-09-16 RX ORDER — NITROGLYCERIN 0.4 MG/1
0.4 TABLET SUBLINGUAL
COMMUNITY
Start: 2018-02-09 | End: 2021-02-26 | Stop reason: SDUPTHER

## 2020-09-16 RX ORDER — QUETIAPINE FUMARATE 25 MG/1
25 TABLET, FILM COATED ORAL
COMMUNITY
Start: 2019-02-16 | End: 2021-02-26 | Stop reason: SDUPTHER

## 2020-09-16 NOTE — PROGRESS NOTES
Subjective:      Patient ID: Gavin Cohen Sr. is a 74 y.o. male.    Chief Complaint: Coronary Artery Disease (use to see a cardiologist @ [facility] ([facility]) that retired & Dr Hanna)    HPI:  Cardiologist at [facility] retired.    Pt has severe shortness of breath walking about 50 feet.    Pt had a heart attack and a couple of coronary stents many years ago.    Pt coughs a lot    Pt had right BKA after vascular surgery and stents in [place]      Review of Systems   Cardiovascular: Positive for claudication (Lef calf hurts when walking), dyspnea on exertion and orthopnea. Negative for chest pain, irregular heartbeat, leg swelling, near-syncope (pt sleeps in a lounge chair), palpitations and syncope.      Pt sees Dr Escudero, vascular surgeon, who checks the circulation of the left leg every 6 months      Past Medical History:   Diagnosis Date    Acute coronary syndrome     BPH (benign prostatic hyperplasia)     Coronary artery disease     Dementia     Depression     Diabetes mellitus, type 2     Hyperlipidemia     Hypertension     Kidney failure     PAD (peripheral artery disease)         Past Surgical History:   Procedure Laterality Date    AMPUTATION, LOWER LIMB      ANGIOPLASTY      CORONARY ANGIOPLASTY WITH STENT PLACEMENT      left bka         Family History   Problem Relation Age of Onset    Diabetes Mother     Heart disease Father     Lung cancer Sister     Lung cancer Brother     Dementia Brother        Social History     Socioeconomic History    Marital status:      Spouse name: Not on file    Number of children: Not on file    Years of education: Not on file    Highest education level: Not on file   Occupational History    Not on file   Social Needs    Financial resource strain: Not on file    Food insecurity     Worry: Not on file     Inability: Not on file    Transportation needs     Medical: Not on file     Non-medical: Not on file   Tobacco Use    Smoking status:  "Current Every Day Smoker     Packs/day: 1.00    Smokeless tobacco: Never Used   Substance and Sexual Activity    Alcohol use: No     Frequency: Never    Drug use: No    Sexual activity: Not on file   Lifestyle    Physical activity     Days per week: Not on file     Minutes per session: Not on file    Stress: Not on file   Relationships    Social connections     Talks on phone: Not on file     Gets together: Not on file     Attends Uatsdin service: Not on file     Active member of club or organization: Not on file     Attends meetings of clubs or organizations: Not on file     Relationship status: Not on file   Other Topics Concern    Not on file   Social History Narrative    Not on file       Current Outpatient Medications on File Prior to Visit   Medication Sig Dispense Refill    albuterol (VENTOLIN HFA) 90 mcg/actuation inhaler Inhale 2 puffs into the lungs every 4 (four) hours as needed. Rescue 18 g 1    aspirin (ECOTRIN) 81 MG EC tablet Take 1 tablet (81 mg total) by mouth once daily. 90 tablet 1    carisoprodoL (SOMA) 350 MG tablet TAKE ONE TABLET BY MOUTH UP TO THREE TIMES DAILY AS NEEDED FOR MUSCLE RELAXANT      citalopram (CELEXA) 20 MG tablet Take 1 tablet (20 mg total) by mouth once daily. 90 tablet 1    COMFORT EZ PEN NEEDLES 33 gauge x 3/16" Ndle USE WITH BASAGLAR 100 each 5    finasteride (PROSCAR) 5 mg tablet Take 1 tablet (5 mg total) by mouth once daily. 90 tablet 1    gabapentin (NEURONTIN) 100 MG capsule TAKE ONE CAPSULE BY MOUTH TWICE DAILY FOR NEUROPATHY-(NERVE PAIN)- 180 capsule 1    insulin detemir U-100 (LEVEMIR FLEXTOUCH U-100 INSULN) 100 unit/mL (3 mL) InPn pen Inject 20 units subcutaneously nightly. 15 mL 1    nitroGLYCERIN (NITROSTAT) 0.4 MG SL tablet 0.4 mg.      oxyCODONE (ROXICODONE) 30 MG Tab Take 30 mg by mouth every 8 (eight) hours as needed.      prasugreL (EFFIENT) 10 mg Tab TAKE ONE TABLET BY MOUTH DAILY FOR THE HEART 90 tablet 0    pravastatin (PRAVACHOL) " "40 MG tablet Take 1 tablet (40 mg total) by mouth once daily. 90 tablet 1    QUEtiapine (SEROQUEL) 25 MG Tab Take 25 mg by mouth.      tamsulosin (FLOMAX) 0.4 mg Cap TAKE ONE CAPSULE BY MOUTH EVERY DAY FOR PROSTATE 90 capsule 1    [DISCONTINUED] QUEtiapine (SEROQUEL) 50 MG tablet Take 1 tablet (50 mg total) by mouth every evening. 90 tablet 1     No current facility-administered medications on file prior to visit.        Review of patient's allergies indicates:   Allergen Reactions    Cephalosporins Other (See Comments)    Iodine and iodide containing products     Levofloxacin Swelling    Penicillins Swelling     Objective:     Vitals:    09/16/20 1115   BP: 113/69   BP Location: Right arm   Patient Position: Sitting   BP Method: Large (Automatic)   Pulse: 77   SpO2: 99%   Weight: Comment: Wheelchair   Height: 5' 8" (1.727 m)        Physical Exam   Constitutional: He is oriented to person, place, and time. He appears well-developed and well-nourished. No distress.   Eyes: No scleral icterus.   Neck: No JVD present. Carotid bruit is not present.   Cardiovascular: Regular rhythm and normal heart sounds. Exam reveals no gallop and no friction rub.   No murmur heard.  Pulses:       Dorsalis pedis pulses are 0 on the left side.        Posterior tibial pulses are 0 on the left side.   Pulmonary/Chest: Effort normal. No respiratory distress. He has wheezes (bilateral).   Abdominal: He exhibits no abdominal bruit, no pulsatile midline mass and no mass. There is no abdominal tenderness.   Musculoskeletal:         General: Deformity (right BKA) and edema (trace pitting pedal edema LLE) present.   Neurological: He is alert and oriented to person, place, and time.   Skin: Skin is warm and dry. He is not diaphoretic.   Psychiatric: He has a normal mood and affect. His behavior is normal. Judgment and thought content normal.   Vitals reviewed.       Lab 4/14/18: creat 2.2, BUN 35, alb 3.2    ECG today:  NSR, RBBB, left " axis deviation, possible septal scar      No visits with results within 6 Month(s) from this visit.   Latest known visit with results is:   Lab Visit on 05/23/2019   Component Date Value Ref Range Status    Fecal Immunochemical Test (iFOBT) 05/23/2019 Negative  Negative Final   (  Assessment:     1. Coronary artery disease involving native coronary artery of native heart, angina presence unspecified    2. PAD (peripheral artery disease)    3. Mixed hyperlipidemia    4. Type 2 diabetes mellitus with stage 3 chronic kidney disease, with long-term current use of insulin    5. S/P BKA (below knee amputation), right    6. S/P PTCA (percutaneous transluminal coronary angioplasty)    7. Old MI (myocardial infarction)    8. Dyspnea on exertion    9. CKD (chronic kidney disease) stage 4, GFR 15-29 ml/min      Plan:   Gavin was seen today for coronary artery disease.    Diagnoses and all orders for this visit:    Coronary artery disease involving native coronary artery of native heart, angina presence unspecified  -     Ambulatory referral/consult to Cardiology  -     IN OFFICE EKG 12-LEAD (to Muse)    PAD (peripheral artery disease)    Mixed hyperlipidemia    Type 2 diabetes mellitus with stage 3 chronic kidney disease, with long-term current use of insulin    S/P BKA (below knee amputation), right    S/P PTCA (percutaneous transluminal coronary angioplasty)  -     IN OFFICE EKG 12-LEAD (to Muse)    Old MI (myocardial infarction)  -     IN OFFICE EKG 12-LEAD (to Muse)    Dyspnea on exertion    CKD (chronic kidney disease) stage 4, GFR 15-29 ml/min     Will check echocardiogram and CXR due to shortness of breath with exertion    Repeat lab    Same meds except will discontinue Effient and substitute clopidogrel due to risk of bleeding at age 75 or older.    Pt reminded to never take NSAID's    Smoking cessation counseled    F/u with Dr Escudero for PAD    F/u with nephrologist at [facility] for CKD    Same meds    Will get  old records from [facility]    Follow up in about 6 months (around 3/16/2021).

## 2020-09-16 NOTE — LETTER
September 16, 2020      Ailyn Wise, MOLLY  [address] [place] [address]           [facility]  [address]  [place] [address]  Phone: [phone]  Fax: [phone]          Patient: Gavin Cohen Sr.   MR Number: [id number]   YOB: 1945   Date of Visit: 9/16/2020       Dear Ailyn Wise:    Thank you for referring Gavin Cohen to me for evaluation. Attached you will find relevant portions of my assessment and plan of care.    If you have questions, please do not hesitate to call me. I look forward to following Gavin Cohen along with you.    Sincerely,    Michael Mays MD    Enclosure  CC:  No Recipients    If you would like to receive this communication electronically, please contact [email or web address] or [phone] to request more information on Create Link access.    For providers and/or their staff who would like to refer a patient to [facility], please contact us through our one-stop-shop provider referral line, [facility], at [phone].    If you feel you have received this communication in error or would no longer like to receive these types of communications, please e-mail [email or web address]

## 2020-09-23 LAB
AORTIC ROOT ANNULUS: 3.51 CM
AORTIC VALVE CUSP SEPERATION: 1.94 CM
AV PEAK GRADIENT: 5 MMHG
CV ECHO LV RWT: 0.52 CM
DOP CALC AO PEAK VEL: 1.17 M/S
DOP CALC LVOT AREA: 2.2 CM2
DOP CALC LVOT DIAMETER: 1.68 CM
E WAVE DECELERATION TIME: 147.82 MSEC
E/A RATIO: 0.6
ECHO LV POSTERIOR WALL: 1.11 CM (ref 0.6–1.1)
FRACTIONAL SHORTENING: 28 % (ref 28–44)
INTERVENTRICULAR SEPTUM: 1.41 CM (ref 0.6–1.1)
LEFT ATRIUM SIZE: 3.19 CM
LEFT INTERNAL DIMENSION IN SYSTOLE: 3.06 CM (ref 2.1–4)
LEFT VENTRICLE DIASTOLIC VOLUME: 81.61 ML
LEFT VENTRICLE SYSTOLIC VOLUME: 36.77 ML
LEFT VENTRICULAR INTERNAL DIMENSION IN DIASTOLE: 4.27 CM (ref 3.5–6)
LEFT VENTRICULAR MASS: 196.28 G
MV PEAK A VEL: 1.36 M/S
MV PEAK E VEL: 0.82 M/S
MV STENOSIS PRESSURE HALF TIME: 37.21 MS
MV VALVE AREA P 1/2 METHOD: 5.91 CM2
PV PEAK VELOCITY: 0.97 CM/S
RA PRESSURE: 3 MMHG
RIGHT VENTRICULAR END-DIASTOLIC DIMENSION: 1.71 CM

## 2020-09-24 ENCOUNTER — TELEPHONE (OUTPATIENT)
Dept: CARDIOLOGY | Facility: CLINIC | Age: 75
End: 2020-09-24

## 2020-09-24 NOTE — TELEPHONE ENCOUNTER
I spoke with patient's daughter.  Echocardiogram shows heart is strong.  Lab shows CKD III with creat 1.7=stable.  Pt has f/u appt with nephrologist.  Mild anemia goes along with CKD. ( Pt also has thrombocytopenia)  Lipid panel is OK  HgbA1C is 9.  Low carb, no sugar diet discussed.  Pt needs to f/u with Dr Patiño  CXR shows a lot of scarring due to smoking.  (BNP WNL)

## 2020-10-21 DIAGNOSIS — Z79.4 TYPE 2 DIABETES MELLITUS WITH STAGE 3 CHRONIC KIDNEY DISEASE, WITH LONG-TERM CURRENT USE OF INSULIN: ICD-10-CM

## 2020-10-21 DIAGNOSIS — E11.22 TYPE 2 DIABETES MELLITUS WITH STAGE 3 CHRONIC KIDNEY DISEASE, WITH LONG-TERM CURRENT USE OF INSULIN: ICD-10-CM

## 2020-10-21 DIAGNOSIS — N18.30 TYPE 2 DIABETES MELLITUS WITH STAGE 3 CHRONIC KIDNEY DISEASE, WITH LONG-TERM CURRENT USE OF INSULIN: ICD-10-CM

## 2020-10-21 RX ORDER — INSULIN DETEMIR 100 [IU]/ML
INJECTION, SOLUTION SUBCUTANEOUS
Qty: 15 ML | Refills: 1 | Status: SHIPPED | OUTPATIENT
Start: 2020-10-21 | End: 2020-11-23

## 2020-10-21 NOTE — TELEPHONE ENCOUNTER
----- Message from Grover Maier sent at 10/21/2020 11:13 AM CDT -----  Regarding: Daughter Carolyn [phone]  Is this a refill or new RX: Refill    RX name and strength: insulin detemir U-100 (LEVEMIR FLEXTOUCH U-100 INSULN) 100 unit/mL (3 mL) InPn pen    Pharmacy name and phone # [facility] [address] [place] [address], [address] [phone] (Phone) [phone] (Fax)

## 2021-01-27 DIAGNOSIS — Z79.4 TYPE 2 DIABETES MELLITUS WITH STAGE 3 CHRONIC KIDNEY DISEASE, WITH LONG-TERM CURRENT USE OF INSULIN: ICD-10-CM

## 2021-01-27 DIAGNOSIS — N18.30 TYPE 2 DIABETES MELLITUS WITH STAGE 3 CHRONIC KIDNEY DISEASE, WITH LONG-TERM CURRENT USE OF INSULIN: ICD-10-CM

## 2021-01-27 DIAGNOSIS — E11.22 TYPE 2 DIABETES MELLITUS WITH STAGE 3 CHRONIC KIDNEY DISEASE, WITH LONG-TERM CURRENT USE OF INSULIN: ICD-10-CM

## 2021-01-27 RX ORDER — INSULIN DETEMIR 100 [IU]/ML
INJECTION, SOLUTION SUBCUTANEOUS
Qty: 15 ML | Refills: 0 | Status: SHIPPED | OUTPATIENT
Start: 2021-01-27 | End: 2021-02-26 | Stop reason: SDUPTHER

## 2021-02-26 ENCOUNTER — OFFICE VISIT (OUTPATIENT)
Dept: PRIMARY CARE CLINIC | Facility: CLINIC | Age: 76
End: 2021-02-26
Payer: MEDICARE

## 2021-02-26 VITALS
OXYGEN SATURATION: 95 % | SYSTOLIC BLOOD PRESSURE: 102 MMHG | HEIGHT: 68 IN | HEART RATE: 81 BPM | TEMPERATURE: 98 F | BODY MASS INDEX: 28.86 KG/M2 | WEIGHT: 190.44 LBS | RESPIRATION RATE: 16 BRPM | DIASTOLIC BLOOD PRESSURE: 56 MMHG

## 2021-02-26 DIAGNOSIS — N18.32 TYPE 2 DIABETES MELLITUS WITH STAGE 3B CHRONIC KIDNEY DISEASE, WITH LONG-TERM CURRENT USE OF INSULIN: Primary | ICD-10-CM

## 2021-02-26 DIAGNOSIS — G89.21 CHRONIC PAIN DUE TO TRAUMA: ICD-10-CM

## 2021-02-26 DIAGNOSIS — E78.5 HYPERLIPIDEMIA, UNSPECIFIED HYPERLIPIDEMIA TYPE: ICD-10-CM

## 2021-02-26 DIAGNOSIS — N13.8 BENIGN PROSTATIC HYPERPLASIA WITH URINARY OBSTRUCTION: ICD-10-CM

## 2021-02-26 DIAGNOSIS — I25.2 OLD MI (MYOCARDIAL INFARCTION): ICD-10-CM

## 2021-02-26 DIAGNOSIS — F32.1 CURRENT MODERATE EPISODE OF MAJOR DEPRESSIVE DISORDER WITHOUT PRIOR EPISODE: ICD-10-CM

## 2021-02-26 DIAGNOSIS — Z79.4 TYPE 2 DIABETES MELLITUS WITH STAGE 3B CHRONIC KIDNEY DISEASE, WITH LONG-TERM CURRENT USE OF INSULIN: Primary | ICD-10-CM

## 2021-02-26 DIAGNOSIS — J20.9 ACUTE BRONCHITIS, UNSPECIFIED ORGANISM: ICD-10-CM

## 2021-02-26 DIAGNOSIS — N40.1 BENIGN PROSTATIC HYPERPLASIA WITH URINARY OBSTRUCTION: ICD-10-CM

## 2021-02-26 DIAGNOSIS — I25.10 CORONARY ARTERY DISEASE INVOLVING NATIVE CORONARY ARTERY OF NATIVE HEART, ANGINA PRESENCE UNSPECIFIED: ICD-10-CM

## 2021-02-26 DIAGNOSIS — L03.011 PARONYCHIA OF FINGER, RIGHT: ICD-10-CM

## 2021-02-26 DIAGNOSIS — E11.22 TYPE 2 DIABETES MELLITUS WITH STAGE 3B CHRONIC KIDNEY DISEASE, WITH LONG-TERM CURRENT USE OF INSULIN: Primary | ICD-10-CM

## 2021-02-26 DIAGNOSIS — G47.00 INSOMNIA, UNSPECIFIED TYPE: ICD-10-CM

## 2021-02-26 PROCEDURE — 99215 OFFICE O/P EST HI 40 MIN: CPT | Mod: PBBFAC,PN | Performed by: STUDENT IN AN ORGANIZED HEALTH CARE EDUCATION/TRAINING PROGRAM

## 2021-02-26 PROCEDURE — 99215 PR OFFICE/OUTPT VISIT, EST, LEVL V, 40-54 MIN: ICD-10-PCS | Mod: S$PBB,,, | Performed by: STUDENT IN AN ORGANIZED HEALTH CARE EDUCATION/TRAINING PROGRAM

## 2021-02-26 PROCEDURE — 99999 PR PBB SHADOW E&M-EST. PATIENT-LVL V: ICD-10-PCS | Mod: PBBFAC,,, | Performed by: STUDENT IN AN ORGANIZED HEALTH CARE EDUCATION/TRAINING PROGRAM

## 2021-02-26 PROCEDURE — 99215 OFFICE O/P EST HI 40 MIN: CPT | Mod: S$PBB,,, | Performed by: STUDENT IN AN ORGANIZED HEALTH CARE EDUCATION/TRAINING PROGRAM

## 2021-02-26 PROCEDURE — 99999 PR PBB SHADOW E&M-EST. PATIENT-LVL V: CPT | Mod: PBBFAC,,, | Performed by: STUDENT IN AN ORGANIZED HEALTH CARE EDUCATION/TRAINING PROGRAM

## 2021-02-26 RX ORDER — FINASTERIDE 5 MG/1
5 TABLET, FILM COATED ORAL DAILY
Qty: 90 TABLET | Refills: 3 | Status: SHIPPED | OUTPATIENT
Start: 2021-02-26 | End: 2022-06-16 | Stop reason: SDUPTHER

## 2021-02-26 RX ORDER — CITALOPRAM 20 MG/1
20 TABLET, FILM COATED ORAL DAILY
Qty: 90 TABLET | Refills: 3 | Status: SHIPPED | OUTPATIENT
Start: 2021-02-26 | End: 2022-06-16 | Stop reason: SDUPTHER

## 2021-02-26 RX ORDER — MUPIROCIN 20 MG/G
OINTMENT TOPICAL 2 TIMES DAILY
Qty: 15 G | Refills: 0 | Status: SHIPPED | OUTPATIENT
Start: 2021-02-26 | End: 2021-10-07

## 2021-02-26 RX ORDER — ASPIRIN 81 MG/1
81 TABLET ORAL DAILY
Qty: 90 TABLET | Refills: 3 | Status: SHIPPED | OUTPATIENT
Start: 2021-02-26 | End: 2022-06-16 | Stop reason: SDUPTHER

## 2021-02-26 RX ORDER — TAMSULOSIN HYDROCHLORIDE 0.4 MG/1
CAPSULE ORAL
Qty: 90 CAPSULE | Refills: 1 | Status: SHIPPED | OUTPATIENT
Start: 2021-02-26 | End: 2021-10-07 | Stop reason: SDUPTHER

## 2021-02-26 RX ORDER — NITROGLYCERIN 0.4 MG/1
0.4 TABLET SUBLINGUAL EVERY 5 MIN PRN
Qty: 15 TABLET | Refills: 0 | Status: SHIPPED | OUTPATIENT
Start: 2021-02-26 | End: 2022-06-16 | Stop reason: SDUPTHER

## 2021-02-26 RX ORDER — PRAVASTATIN SODIUM 40 MG/1
40 TABLET ORAL DAILY
Qty: 90 TABLET | Refills: 3 | Status: SHIPPED | OUTPATIENT
Start: 2021-02-26 | End: 2022-06-16 | Stop reason: SDUPTHER

## 2021-02-26 RX ORDER — CLOPIDOGREL BISULFATE 75 MG/1
75 TABLET ORAL DAILY
Qty: 90 TABLET | Refills: 3 | Status: SHIPPED | OUTPATIENT
Start: 2021-02-26 | End: 2021-10-07

## 2021-02-26 RX ORDER — QUETIAPINE FUMARATE 25 MG/1
25 TABLET, FILM COATED ORAL NIGHTLY
Qty: 90 TABLET | Refills: 1 | Status: SHIPPED | OUTPATIENT
Start: 2021-02-26 | End: 2021-10-07 | Stop reason: SDUPTHER

## 2021-02-26 RX ORDER — INSULIN DETEMIR 100 [IU]/ML
INJECTION, SOLUTION SUBCUTANEOUS
Qty: 6 ML | Refills: 11 | Status: SHIPPED | OUTPATIENT
Start: 2021-02-26 | End: 2021-10-07 | Stop reason: SDUPTHER

## 2021-02-26 RX ORDER — ALBUTEROL SULFATE 90 UG/1
2 AEROSOL, METERED RESPIRATORY (INHALATION) EVERY 4 HOURS PRN
Qty: 18 G | Refills: 1 | Status: SHIPPED | OUTPATIENT
Start: 2021-02-26 | End: 2022-06-17 | Stop reason: SDUPTHER

## 2021-02-26 RX ORDER — GABAPENTIN 100 MG/1
CAPSULE ORAL
Qty: 180 CAPSULE | Refills: 3 | Status: SHIPPED | OUTPATIENT
Start: 2021-02-26 | End: 2022-06-17 | Stop reason: SDUPTHER

## 2021-05-13 ENCOUNTER — PES CALL (OUTPATIENT)
Dept: ADMINISTRATIVE | Facility: CLINIC | Age: 76
End: 2021-05-13

## 2021-08-03 ENCOUNTER — PATIENT MESSAGE (OUTPATIENT)
Dept: ADMINISTRATIVE | Facility: HOSPITAL | Age: 76
End: 2021-08-03

## 2021-08-16 ENCOUNTER — PES CALL (OUTPATIENT)
Dept: ADMINISTRATIVE | Facility: CLINIC | Age: 76
End: 2021-08-16

## 2021-08-19 ENCOUNTER — PES CALL (OUTPATIENT)
Dept: ADMINISTRATIVE | Facility: CLINIC | Age: 76
End: 2021-08-19

## 2021-10-07 ENCOUNTER — OFFICE VISIT (OUTPATIENT)
Dept: PRIMARY CARE CLINIC | Facility: CLINIC | Age: 76
End: 2021-10-07
Payer: MEDICARE

## 2021-10-07 VITALS
BODY MASS INDEX: 28.81 KG/M2 | RESPIRATION RATE: 16 BRPM | OXYGEN SATURATION: 98 % | HEART RATE: 68 BPM | SYSTOLIC BLOOD PRESSURE: 108 MMHG | HEIGHT: 68 IN | TEMPERATURE: 98 F | DIASTOLIC BLOOD PRESSURE: 56 MMHG | WEIGHT: 190.13 LBS

## 2021-10-07 DIAGNOSIS — N18.32 TYPE 2 DIABETES MELLITUS WITH STAGE 3B CHRONIC KIDNEY DISEASE, WITH LONG-TERM CURRENT USE OF INSULIN: ICD-10-CM

## 2021-10-07 DIAGNOSIS — G47.00 INSOMNIA, UNSPECIFIED TYPE: ICD-10-CM

## 2021-10-07 DIAGNOSIS — N13.8 BENIGN PROSTATIC HYPERPLASIA WITH URINARY OBSTRUCTION: ICD-10-CM

## 2021-10-07 DIAGNOSIS — Z79.4 TYPE 2 DIABETES MELLITUS WITH STAGE 3B CHRONIC KIDNEY DISEASE, WITH LONG-TERM CURRENT USE OF INSULIN: ICD-10-CM

## 2021-10-07 DIAGNOSIS — E11.22 TYPE 2 DIABETES MELLITUS WITH STAGE 3B CHRONIC KIDNEY DISEASE, WITH LONG-TERM CURRENT USE OF INSULIN: ICD-10-CM

## 2021-10-07 DIAGNOSIS — Z12.11 COLON CANCER SCREENING: ICD-10-CM

## 2021-10-07 DIAGNOSIS — I25.2 OLD MI (MYOCARDIAL INFARCTION): ICD-10-CM

## 2021-10-07 DIAGNOSIS — F17.210 NICOTINE DEPENDENCE, CIGARETTES, UNCOMPLICATED: ICD-10-CM

## 2021-10-07 DIAGNOSIS — N40.1 BENIGN PROSTATIC HYPERPLASIA WITH URINARY OBSTRUCTION: ICD-10-CM

## 2021-10-07 DIAGNOSIS — Z11.4 ENCOUNTER FOR SCREENING FOR HIV: ICD-10-CM

## 2021-10-07 DIAGNOSIS — Z13.6 SCREENING FOR CARDIOVASCULAR CONDITION: ICD-10-CM

## 2021-10-07 DIAGNOSIS — Z11.59 NEED FOR HEPATITIS C SCREENING TEST: ICD-10-CM

## 2021-10-07 DIAGNOSIS — F17.200 TOBACCO USE DISORDER: Primary | ICD-10-CM

## 2021-10-07 PROCEDURE — 99214 PR OFFICE/OUTPT VISIT, EST, LEVL IV, 30-39 MIN: ICD-10-PCS | Mod: S$PBB,,, | Performed by: STUDENT IN AN ORGANIZED HEALTH CARE EDUCATION/TRAINING PROGRAM

## 2021-10-07 PROCEDURE — 99214 OFFICE O/P EST MOD 30 MIN: CPT | Mod: PBBFAC,PN | Performed by: STUDENT IN AN ORGANIZED HEALTH CARE EDUCATION/TRAINING PROGRAM

## 2021-10-07 PROCEDURE — 99214 OFFICE O/P EST MOD 30 MIN: CPT | Mod: S$PBB,,, | Performed by: STUDENT IN AN ORGANIZED HEALTH CARE EDUCATION/TRAINING PROGRAM

## 2021-10-07 PROCEDURE — 99999 PR PBB SHADOW E&M-EST. PATIENT-LVL IV: CPT | Mod: PBBFAC,,, | Performed by: STUDENT IN AN ORGANIZED HEALTH CARE EDUCATION/TRAINING PROGRAM

## 2021-10-07 PROCEDURE — 99999 PR PBB SHADOW E&M-EST. PATIENT-LVL IV: ICD-10-PCS | Mod: PBBFAC,,, | Performed by: STUDENT IN AN ORGANIZED HEALTH CARE EDUCATION/TRAINING PROGRAM

## 2021-10-07 RX ORDER — TAMSULOSIN HYDROCHLORIDE 0.4 MG/1
CAPSULE ORAL
Qty: 90 CAPSULE | Refills: 3 | Status: SHIPPED | OUTPATIENT
Start: 2021-10-07 | End: 2022-07-13 | Stop reason: SDUPTHER

## 2021-10-07 RX ORDER — CLOPIDOGREL BISULFATE 75 MG/1
75 TABLET ORAL DAILY
Qty: 90 TABLET | Refills: 3 | Status: SHIPPED | OUTPATIENT
Start: 2021-10-07 | End: 2022-07-13 | Stop reason: SDUPTHER

## 2021-10-07 RX ORDER — QUETIAPINE FUMARATE 25 MG/1
25 TABLET, FILM COATED ORAL NIGHTLY
Qty: 90 TABLET | Refills: 3 | Status: SHIPPED | OUTPATIENT
Start: 2021-10-07 | End: 2022-06-23

## 2021-10-07 RX ORDER — INSULIN DETEMIR 100 [IU]/ML
INJECTION, SOLUTION SUBCUTANEOUS
Qty: 9 ML | Refills: 11 | Status: SHIPPED | OUTPATIENT
Start: 2021-10-07 | End: 2022-07-13 | Stop reason: SDUPTHER

## 2021-10-11 ENCOUNTER — TELEPHONE (OUTPATIENT)
Dept: PRIMARY CARE CLINIC | Facility: CLINIC | Age: 76
End: 2021-10-11

## 2021-10-18 ENCOUNTER — PATIENT MESSAGE (OUTPATIENT)
Dept: ADMINISTRATIVE | Facility: HOSPITAL | Age: 76
End: 2021-10-18
Payer: MEDICAID

## 2021-10-20 ENCOUNTER — TELEPHONE (OUTPATIENT)
Dept: PRIMARY CARE CLINIC | Facility: CLINIC | Age: 76
End: 2021-10-20

## 2021-10-21 ENCOUNTER — TELEPHONE (OUTPATIENT)
Dept: PRIMARY CARE CLINIC | Facility: CLINIC | Age: 76
End: 2021-10-21

## 2021-12-27 DIAGNOSIS — I25.10 CORONARY ARTERY DISEASE INVOLVING NATIVE CORONARY ARTERY OF NATIVE HEART: ICD-10-CM

## 2021-12-28 RX ORDER — PRASUGREL 10 MG/1
TABLET, FILM COATED ORAL
Qty: 90 TABLET | Refills: 0 | OUTPATIENT
Start: 2021-12-28

## 2022-04-05 DIAGNOSIS — Z71.89 COMPLEX CARE COORDINATION: ICD-10-CM

## 2022-06-16 DIAGNOSIS — I25.2 OLD MI (MYOCARDIAL INFARCTION): ICD-10-CM

## 2022-06-16 DIAGNOSIS — F32.1 CURRENT MODERATE EPISODE OF MAJOR DEPRESSIVE DISORDER WITHOUT PRIOR EPISODE: ICD-10-CM

## 2022-06-16 DIAGNOSIS — N40.1 BENIGN PROSTATIC HYPERPLASIA WITH URINARY OBSTRUCTION: ICD-10-CM

## 2022-06-16 DIAGNOSIS — E78.5 HYPERLIPIDEMIA, UNSPECIFIED HYPERLIPIDEMIA TYPE: ICD-10-CM

## 2022-06-16 DIAGNOSIS — N13.8 BENIGN PROSTATIC HYPERPLASIA WITH URINARY OBSTRUCTION: ICD-10-CM

## 2022-06-16 RX ORDER — NITROGLYCERIN 0.4 MG/1
0.4 TABLET SUBLINGUAL EVERY 5 MIN PRN
Qty: 15 TABLET | Refills: 0 | Status: SHIPPED | OUTPATIENT
Start: 2022-06-16 | End: 2023-09-21 | Stop reason: SDUPTHER

## 2022-06-16 RX ORDER — FINASTERIDE 5 MG/1
5 TABLET, FILM COATED ORAL DAILY
Qty: 90 TABLET | Refills: 0 | Status: SHIPPED | OUTPATIENT
Start: 2022-06-16 | End: 2022-07-13 | Stop reason: SDUPTHER

## 2022-06-16 RX ORDER — CITALOPRAM 20 MG/1
20 TABLET, FILM COATED ORAL DAILY
Qty: 90 TABLET | Refills: 0 | Status: SHIPPED | OUTPATIENT
Start: 2022-06-16 | End: 2022-07-13 | Stop reason: SDUPTHER

## 2022-06-16 RX ORDER — PRAVASTATIN SODIUM 40 MG/1
40 TABLET ORAL DAILY
Qty: 90 TABLET | Refills: 0 | Status: SHIPPED | OUTPATIENT
Start: 2022-06-16 | End: 2022-07-13 | Stop reason: SDUPTHER

## 2022-06-16 RX ORDER — ASPIRIN 81 MG/1
81 TABLET ORAL DAILY
Qty: 90 TABLET | Refills: 0 | Status: SHIPPED | OUTPATIENT
Start: 2022-06-16

## 2022-06-16 NOTE — TELEPHONE ENCOUNTER
Care Due:                  Date            Visit Type   Department     Provider  --------------------------------------------------------------------------------                                EP -                              PRIMARY      [facility]  Last Visit: 10-      CARE ([facility])   PRIMARY CARE   Otis Martinez  Next Visit: None Scheduled  None         None Found                                                            Last  Test          Frequency    Reason                     Performed    Due Date  --------------------------------------------------------------------------------    HBA1C.......  6 months...  insulin..................  10-   04-    [facility] Embedded Care Gaps. Reference number: [id number]. 6/16/2022   10:39:21 AM CDT

## 2022-06-17 ENCOUNTER — PATIENT MESSAGE (OUTPATIENT)
Dept: ADMINISTRATIVE | Facility: HOSPITAL | Age: 77
End: 2022-06-17
Payer: MEDICAID

## 2022-06-17 ENCOUNTER — TELEPHONE (OUTPATIENT)
Dept: PRIMARY CARE CLINIC | Facility: CLINIC | Age: 77
End: 2022-06-17
Payer: MEDICAID

## 2022-06-17 DIAGNOSIS — G89.21 CHRONIC PAIN DUE TO TRAUMA: ICD-10-CM

## 2022-06-17 DIAGNOSIS — J20.9 ACUTE BRONCHITIS, UNSPECIFIED ORGANISM: ICD-10-CM

## 2022-06-17 DIAGNOSIS — N18.32 TYPE 2 DIABETES MELLITUS WITH STAGE 3B CHRONIC KIDNEY DISEASE, WITH LONG-TERM CURRENT USE OF INSULIN: Primary | ICD-10-CM

## 2022-06-17 DIAGNOSIS — Z79.4 TYPE 2 DIABETES MELLITUS WITH STAGE 3B CHRONIC KIDNEY DISEASE, WITH LONG-TERM CURRENT USE OF INSULIN: Primary | ICD-10-CM

## 2022-06-17 DIAGNOSIS — E11.22 TYPE 2 DIABETES MELLITUS WITH STAGE 3B CHRONIC KIDNEY DISEASE, WITH LONG-TERM CURRENT USE OF INSULIN: Primary | ICD-10-CM

## 2022-06-17 NOTE — TELEPHONE ENCOUNTER
No new care gaps identified.  [facility] Embedded Care Gaps. Reference number: [id number]. 6/17/2022   3:15:55 PM CDT

## 2022-06-17 NOTE — TELEPHONE ENCOUNTER
----- Message from Julissa Teran sent at 6/17/2022 12:18 PM CDT -----  Pt would like to be called back regarding an appt  need me refill    Pt can be reached at  [phone]

## 2022-06-20 RX ORDER — GABAPENTIN 100 MG/1
CAPSULE ORAL
Qty: 60 CAPSULE | Refills: 2 | Status: SHIPPED | OUTPATIENT
Start: 2022-06-20 | End: 2022-07-13 | Stop reason: SDUPTHER

## 2022-06-20 RX ORDER — ALBUTEROL SULFATE 90 UG/1
2 AEROSOL, METERED RESPIRATORY (INHALATION) EVERY 4 HOURS PRN
Qty: 18 G | Refills: 1 | Status: SHIPPED | OUTPATIENT
Start: 2022-06-20 | End: 2022-07-13 | Stop reason: SDUPTHER

## 2022-06-22 DIAGNOSIS — G47.00 INSOMNIA, UNSPECIFIED TYPE: ICD-10-CM

## 2022-06-22 NOTE — TELEPHONE ENCOUNTER
No new care gaps identified.  [facility] Embedded Care Gaps. Reference number: [id number]. 6/22/2022   5:30:42 PM CDT

## 2022-06-23 RX ORDER — QUETIAPINE FUMARATE 25 MG/1
TABLET, FILM COATED ORAL
Qty: 30 TABLET | Refills: 1 | Status: SHIPPED | OUTPATIENT
Start: 2022-06-23 | End: 2022-07-13 | Stop reason: SDUPTHER

## 2022-07-13 ENCOUNTER — OFFICE VISIT (OUTPATIENT)
Dept: PRIMARY CARE CLINIC | Facility: CLINIC | Age: 77
End: 2022-07-13
Payer: MEDICARE

## 2022-07-13 VITALS
SYSTOLIC BLOOD PRESSURE: 128 MMHG | WEIGHT: 190.5 LBS | HEIGHT: 68 IN | OXYGEN SATURATION: 97 % | BODY MASS INDEX: 28.87 KG/M2 | TEMPERATURE: 98 F | DIASTOLIC BLOOD PRESSURE: 72 MMHG | HEART RATE: 76 BPM | RESPIRATION RATE: 16 BRPM

## 2022-07-13 DIAGNOSIS — E11.22 TYPE 2 DIABETES MELLITUS WITH STAGE 3B CHRONIC KIDNEY DISEASE, WITH LONG-TERM CURRENT USE OF INSULIN: Primary | ICD-10-CM

## 2022-07-13 DIAGNOSIS — N18.32 TYPE 2 DIABETES MELLITUS WITH STAGE 3B CHRONIC KIDNEY DISEASE, WITH LONG-TERM CURRENT USE OF INSULIN: Primary | ICD-10-CM

## 2022-07-13 DIAGNOSIS — S80.812D ABRASION, LEFT LOWER LEG, SUBSEQUENT ENCOUNTER: ICD-10-CM

## 2022-07-13 DIAGNOSIS — Z89.511 S/P BKA (BELOW KNEE AMPUTATION), RIGHT: ICD-10-CM

## 2022-07-13 DIAGNOSIS — N13.8 BENIGN PROSTATIC HYPERPLASIA WITH URINARY OBSTRUCTION: ICD-10-CM

## 2022-07-13 DIAGNOSIS — Z79.4 TYPE 2 DIABETES MELLITUS WITH STAGE 3B CHRONIC KIDNEY DISEASE, WITH LONG-TERM CURRENT USE OF INSULIN: Primary | ICD-10-CM

## 2022-07-13 DIAGNOSIS — I73.9 PAD (PERIPHERAL ARTERY DISEASE): ICD-10-CM

## 2022-07-13 DIAGNOSIS — Z23 NEED FOR VACCINATION: ICD-10-CM

## 2022-07-13 DIAGNOSIS — N40.1 BENIGN PROSTATIC HYPERPLASIA WITH URINARY OBSTRUCTION: ICD-10-CM

## 2022-07-13 DIAGNOSIS — J20.9 ACUTE BRONCHITIS, UNSPECIFIED ORGANISM: ICD-10-CM

## 2022-07-13 DIAGNOSIS — E78.5 HYPERLIPIDEMIA, UNSPECIFIED HYPERLIPIDEMIA TYPE: ICD-10-CM

## 2022-07-13 DIAGNOSIS — I25.2 OLD MI (MYOCARDIAL INFARCTION): ICD-10-CM

## 2022-07-13 DIAGNOSIS — G47.00 INSOMNIA, UNSPECIFIED TYPE: ICD-10-CM

## 2022-07-13 DIAGNOSIS — F32.1 CURRENT MODERATE EPISODE OF MAJOR DEPRESSIVE DISORDER WITHOUT PRIOR EPISODE: ICD-10-CM

## 2022-07-13 DIAGNOSIS — G89.21 CHRONIC PAIN DUE TO TRAUMA: ICD-10-CM

## 2022-07-13 DIAGNOSIS — N18.4 CKD (CHRONIC KIDNEY DISEASE) STAGE 4, GFR 15-29 ML/MIN: ICD-10-CM

## 2022-07-13 PROCEDURE — 99999 PR PBB SHADOW E&M-EST. PATIENT-LVL V: ICD-10-PCS | Mod: PBBFAC,,, | Performed by: STUDENT IN AN ORGANIZED HEALTH CARE EDUCATION/TRAINING PROGRAM

## 2022-07-13 PROCEDURE — 99214 PR OFFICE/OUTPT VISIT, EST, LEVL IV, 30-39 MIN: ICD-10-PCS | Mod: S$PBB,,, | Performed by: STUDENT IN AN ORGANIZED HEALTH CARE EDUCATION/TRAINING PROGRAM

## 2022-07-13 PROCEDURE — 99215 OFFICE O/P EST HI 40 MIN: CPT | Mod: PBBFAC,PN | Performed by: STUDENT IN AN ORGANIZED HEALTH CARE EDUCATION/TRAINING PROGRAM

## 2022-07-13 PROCEDURE — 90715 TDAP VACCINE 7 YRS/> IM: CPT | Mod: PBBFAC,PN

## 2022-07-13 PROCEDURE — 99999 PR PBB SHADOW E&M-EST. PATIENT-LVL V: CPT | Mod: PBBFAC,,, | Performed by: STUDENT IN AN ORGANIZED HEALTH CARE EDUCATION/TRAINING PROGRAM

## 2022-07-13 PROCEDURE — 99214 OFFICE O/P EST MOD 30 MIN: CPT | Mod: S$PBB,,, | Performed by: STUDENT IN AN ORGANIZED HEALTH CARE EDUCATION/TRAINING PROGRAM

## 2022-07-13 RX ORDER — TAMSULOSIN HYDROCHLORIDE 0.4 MG/1
CAPSULE ORAL
Qty: 90 CAPSULE | Refills: 1 | Status: SHIPPED | OUTPATIENT
Start: 2022-07-13 | End: 2023-04-05

## 2022-07-13 RX ORDER — QUETIAPINE FUMARATE 25 MG/1
25 TABLET, FILM COATED ORAL NIGHTLY
Qty: 90 TABLET | Refills: 1 | Status: SHIPPED | OUTPATIENT
Start: 2022-07-13 | End: 2022-12-16

## 2022-07-13 RX ORDER — INSULIN DETEMIR 100 [IU]/ML
INJECTION, SOLUTION SUBCUTANEOUS
Qty: 9 ML | Refills: 11 | Status: SHIPPED | OUTPATIENT
Start: 2022-07-13 | End: 2023-08-10

## 2022-07-13 RX ORDER — GABAPENTIN 100 MG/1
CAPSULE ORAL
Qty: 180 CAPSULE | Refills: 1 | Status: SHIPPED | OUTPATIENT
Start: 2022-07-13 | End: 2022-12-08

## 2022-07-13 RX ORDER — ALBUTEROL SULFATE 90 UG/1
2 AEROSOL, METERED RESPIRATORY (INHALATION) EVERY 4 HOURS PRN
Qty: 18 G | Refills: 1 | Status: SHIPPED | OUTPATIENT
Start: 2022-07-13 | End: 2022-09-08

## 2022-07-13 RX ORDER — CITALOPRAM 20 MG/1
20 TABLET, FILM COATED ORAL DAILY
Qty: 90 TABLET | Refills: 1 | Status: SHIPPED | OUTPATIENT
Start: 2022-07-13 | End: 2022-12-08

## 2022-07-13 RX ORDER — LOSARTAN POTASSIUM 25 MG/1
12.5 TABLET ORAL DAILY
Qty: 30 TABLET | Refills: 5 | Status: SHIPPED | OUTPATIENT
Start: 2022-07-13 | End: 2023-06-15

## 2022-07-13 RX ORDER — FINASTERIDE 5 MG/1
5 TABLET, FILM COATED ORAL DAILY
Qty: 90 TABLET | Refills: 1 | Status: SHIPPED | OUTPATIENT
Start: 2022-07-13 | End: 2022-12-08

## 2022-07-13 RX ORDER — CLOPIDOGREL BISULFATE 75 MG/1
75 TABLET ORAL DAILY
Qty: 90 TABLET | Refills: 1 | Status: SHIPPED | OUTPATIENT
Start: 2022-07-13 | End: 2022-12-08

## 2022-07-13 RX ORDER — PRAVASTATIN SODIUM 40 MG/1
40 TABLET ORAL DAILY
Qty: 90 TABLET | Refills: 1 | Status: SHIPPED | OUTPATIENT
Start: 2022-07-13 | End: 2022-12-08

## 2022-07-13 NOTE — PROGRESS NOTES
"Subjective:           Patient ID: Gavin Cohen Sr. is a 76 y.o. male who presents today with a chief complaint of DM and labs.    Chief Complaint:   Follow-up (DM & labs)      History of Present Illness:    77yo male presenting for f/u on DM and labs.     Due for multiple screening exams/vaccines: tetanus, colonoscopy, eye screen, foot exam and PCV 20.        Review of Systems   Constitutional: Positive for fatigue. Negative for chills and fever.   HENT: Negative for ear pain, postnasal drip and sinus pain.    Respiratory: Negative for cough and shortness of breath.    Cardiovascular: Negative for chest pain.   Gastrointestinal: Negative for diarrhea, nausea and vomiting.   Genitourinary: Positive for difficulty urinating. Negative for dysuria, frequency and urgency.   Musculoskeletal: Positive for myalgias.   Neurological: Positive for numbness. Negative for dizziness, light-headedness and headaches.   Psychiatric/Behavioral: Positive for sleep disturbance. Negative for agitation and confusion.           Objective:        Vitals:    07/13/22 1304   BP: 128/72   BP Location: Right arm   Patient Position: Sitting   BP Method: Medium (Manual)   Pulse: 76   Resp: 16   Temp: 98 °F (36.7 °C)   TempSrc: Oral   SpO2: 97%   Weight: 86.4 kg (190 lb 7.6 oz)   Height: 5' 8" (1.727 m)       Body mass index is 28.96 kg/m².      Physical Exam  Vitals reviewed. Exam conducted with a chaperone present.   Constitutional:       Appearance: Normal appearance. He is not toxic-appearing.      Comments: As per BMI.  Wheelchair-bound patient with a right-sided BKA.  Patient escorted by his daughter to appointment.   HENT:      Head: Normocephalic and atraumatic.      Right Ear: External ear normal.      Left Ear: External ear normal.   Eyes:      Extraocular Movements: Extraocular movements intact.      Conjunctiva/sclera: Conjunctivae normal.   Cardiovascular:      Rate and Rhythm: Normal rate and regular rhythm.   Pulmonary:      " "Effort: Pulmonary effort is normal. No respiratory distress.      Breath sounds: No wheezing or rhonchi.      Comments: Coarse breath sounds on lower right and upper left lungs.  Abdominal:      General: Bowel sounds are normal.      Palpations: Abdomen is soft.      Tenderness: There is no right CVA tenderness or left CVA tenderness.   Musculoskeletal:         General: No swelling.      Cervical back: Normal range of motion.      Left lower leg: No edema.      Comments: Right lower extremity is and prosthesis.  Has plastic molded cup holding distal stump.   Skin:     General: Skin is warm.      Capillary Refill: Capillary refill takes less than 2 seconds.      Coloration: Skin is not jaundiced.      Comments: Diabetic foot exam was completed, patient is missing right foot, left foot is missing 1st and 2nd digits.    Pulse intact.  Sensation intact.    Neurological:      General: No focal deficit present.      Mental Status: He is alert and oriented to person, place, and time.      Motor: No weakness.   Psychiatric:         Mood and Affect: Mood normal.             Lab Results   Component Value Date     07/08/2022    K 5.1 07/08/2022     07/08/2022    CO2 23 07/08/2022    BUN 46 (H) 07/08/2022    CREATININE 2.1 (H) 07/08/2022    ANIONGAP 8 07/08/2022     Lab Results   Component Value Date    HGBA1C 7.3 (H) 07/08/2022     Lab Results   Component Value Date    BNP 66 09/23/2020       Lab Results   Component Value Date    WBC 7.89 07/08/2022    HGB 11.2 (L) 07/08/2022    HCT 34.4 (L) 07/08/2022     (L) 07/08/2022    GRAN 4.5 07/08/2022    GRAN 56.6 07/08/2022     Lab Results   Component Value Date    CHOL 108 (L) 07/08/2022    HDL 35 (L) 07/08/2022    LDLCALC 63.2 07/08/2022    TRIG 49 07/08/2022          Current Outpatient Medications:     aspirin (ECOTRIN) 81 MG EC tablet, Take 1 tablet (81 mg total) by mouth once daily., Disp: 90 tablet, Rfl: 0    COMFORT EZ PEN NEEDLES 33 gauge x 3/16" Ndsukhdev, " "USE WITH BASAGLAR, Disp: 100 each, Rfl: 5    nitroGLYCERIN (NITROSTAT) 0.4 MG SL tablet, Place 1 tablet (0.4 mg total) under the tongue every 5 (five) minutes as needed for Chest pain., Disp: 15 tablet, Rfl: 0    oxyCODONE (ROXICODONE) 30 MG Tab, Take 30 mg by mouth every 8 (eight) hours as needed., Disp: , Rfl:     albuterol (VENTOLIN HFA) 90 mcg/actuation inhaler, Inhale 2 puffs into the lungs every 4 (four) hours as needed for Wheezing. Rescue, Disp: 18 g, Rfl: 1    citalopram (CELEXA) 20 MG tablet, Take 1 tablet (20 mg total) by mouth once daily., Disp: 90 tablet, Rfl: 1    clopidogreL (PLAVIX) 75 mg tablet, Take 1 tablet (75 mg total) by mouth once daily., Disp: 90 tablet, Rfl: 1    finasteride (PROSCAR) 5 mg tablet, Take 1 tablet (5 mg total) by mouth once daily., Disp: 90 tablet, Rfl: 1    gabapentin (NEURONTIN) 100 MG capsule, TAKE ONE CAPSULE BY MOUTH TWICE DAILY FOR NEUROPATHY-(NERVE PAIN)-, Disp: 180 capsule, Rfl: 1    insulin detemir U-100 (LEVEMIR FLEXTOUCH U-100 INSULN) 100 unit/mL (3 mL) InPn pen, INJECT 20 units subcutaneously AT BEDTIME, plus high dose SSI adding 10u if glucose is over 225., Disp: 9 mL, Rfl: 11    losartan (COZAAR) 25 MG tablet, Take 0.5 tablets (12.5 mg total) by mouth once daily., Disp: 30 tablet, Rfl: 5    pravastatin (PRAVACHOL) 40 MG tablet, Take 1 tablet (40 mg total) by mouth once daily., Disp: 90 tablet, Rfl: 1    QUEtiapine (SEROQUEL) 25 MG Tab, Take 1 tablet (25 mg total) by mouth nightly., Disp: 90 tablet, Rfl: 1    tamsulosin (FLOMAX) 0.4 mg Cap, TAKE ONE CAPSULE BY MOUTH EVERY DAY FOR PROSTATE, Disp: 90 capsule, Rfl: 1     Outpatient Encounter Medications as of 7/13/2022   Medication Sig Dispense Refill    aspirin (ECOTRIN) 81 MG EC tablet Take 1 tablet (81 mg total) by mouth once daily. 90 tablet 0    COMFORT EZ PEN NEEDLES 33 gauge x 3/16" Ndle USE WITH BASAGLAR 100 each 5    nitroGLYCERIN (NITROSTAT) 0.4 MG SL tablet Place 1 tablet (0.4 mg total) under " the tongue every 5 (five) minutes as needed for Chest pain. 15 tablet 0    oxyCODONE (ROXICODONE) 30 MG Tab Take 30 mg by mouth every 8 (eight) hours as needed.      [DISCONTINUED] albuterol (VENTOLIN HFA) 90 mcg/actuation inhaler Inhale 2 puffs into the lungs every 4 (four) hours as needed for Wheezing. Rescue 18 g 1    [DISCONTINUED] citalopram (CELEXA) 20 MG tablet Take 1 tablet (20 mg total) by mouth once daily. 90 tablet 0    [DISCONTINUED] clopidogreL (PLAVIX) 75 mg tablet Take 1 tablet (75 mg total) by mouth once daily. 90 tablet 3    [DISCONTINUED] finasteride (PROSCAR) 5 mg tablet Take 1 tablet (5 mg total) by mouth once daily. 90 tablet 0    [DISCONTINUED] gabapentin (NEURONTIN) 100 MG capsule TAKE ONE CAPSULE BY MOUTH TWICE DAILY FOR NEUROPATHY-(NERVE PAIN)- 60 capsule 2    [DISCONTINUED] insulin detemir U-100 (LEVEMIR FLEXTOUCH U-100 INSULN) 100 unit/mL (3 mL) InPn pen INJECT 20 units subcutaneously AT BEDTIME, plus high dose SSI adding 10u if glucose is over 225. 9 mL 11    [DISCONTINUED] pravastatin (PRAVACHOL) 40 MG tablet Take 1 tablet (40 mg total) by mouth once daily. 90 tablet 0    [DISCONTINUED] QUEtiapine (SEROQUEL) 25 MG Tab TAKE ONE TABLET BY MOUTH NIGHTLY 30 tablet 1    [DISCONTINUED] tamsulosin (FLOMAX) 0.4 mg Cap TAKE ONE CAPSULE BY MOUTH EVERY DAY FOR PROSTATE 90 capsule 3    albuterol (VENTOLIN HFA) 90 mcg/actuation inhaler Inhale 2 puffs into the lungs every 4 (four) hours as needed for Wheezing. Rescue 18 g 1    citalopram (CELEXA) 20 MG tablet Take 1 tablet (20 mg total) by mouth once daily. 90 tablet 1    clopidogreL (PLAVIX) 75 mg tablet Take 1 tablet (75 mg total) by mouth once daily. 90 tablet 1    finasteride (PROSCAR) 5 mg tablet Take 1 tablet (5 mg total) by mouth once daily. 90 tablet 1    gabapentin (NEURONTIN) 100 MG capsule TAKE ONE CAPSULE BY MOUTH TWICE DAILY FOR NEUROPATHY-(NERVE PAIN)- 180 capsule 1    insulin detemir U-100 (LEVEMIR FLEXTOUCH U-100  INSULN) 100 unit/mL (3 mL) InPn pen INJECT 20 units subcutaneously AT BEDTIME, plus high dose SSI adding 10u if glucose is over 225. 9 mL 11    losartan (COZAAR) 25 MG tablet Take 0.5 tablets (12.5 mg total) by mouth once daily. 30 tablet 5    pravastatin (PRAVACHOL) 40 MG tablet Take 1 tablet (40 mg total) by mouth once daily. 90 tablet 1    QUEtiapine (SEROQUEL) 25 MG Tab Take 1 tablet (25 mg total) by mouth nightly. 90 tablet 1    tamsulosin (FLOMAX) 0.4 mg Cap TAKE ONE CAPSULE BY MOUTH EVERY DAY FOR PROSTATE 90 capsule 1    [DISCONTINUED] carisoprodoL (SOMA) 350 MG tablet TAKE ONE TABLET BY MOUTH UP TO THREE TIMES DAILY AS NEEDED FOR MUSCLE RELAXANT       No facility-administered encounter medications on file as of 7/13/2022.          Assessment:       1. Type 2 diabetes mellitus with stage 3b chronic kidney disease, with long-term current use of insulin    2. S/P BKA (below knee amputation), right    3. PAD (peripheral artery disease)    4. CKD (chronic kidney disease) stage 4, GFR 15-29 ml/min    5. Current moderate episode of major depressive disorder without prior episode    6. Acute bronchitis, unspecified organism    7. Old MI (myocardial infarction)    8. Benign prostatic hyperplasia with urinary obstruction    9. 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 Hyperlipidemia, unspecified hyperlipidemia type    11. Insomnia, unspecified type    12. Need for vaccination    13. Abrasion, left lower leg, subsequent encounter            Plan:       Type 2 diabetes mellitus with stage 3b chronic kidney disease, with long-term current use of insulin  -     losartan (COZAAR) 25 MG tablet; Take 0.5 tablets (12.5 mg total) by mouth once daily.  Dispense: 30 tablet; Refill: 5  -     insulin detemir U-100 (LEVEMIR FLEXTOUCH U-100 INSULN) 100 unit/mL (3 mL) InPn pen; INJECT 20 units subcutaneously AT BEDTIME, plus high dose SSI adding 10u if glucose is over 225.  Dispense: 9 mL; Refill: 11  -     Ambulatory referral/consult  to Optometry; Future; Expected date: 07/20/2022    S/P BKA (below knee amputation), right    PAD (peripheral artery disease)    CKD (chronic kidney disease) stage 4, GFR 15-29 ml/min  -     losartan (COZAAR) 25 MG tablet; Take 0.5 tablets (12.5 mg total) by mouth once daily.  Dispense: 30 tablet; Refill: 5    Current moderate episode of major depressive disorder without prior episode  -     citalopram (CELEXA) 20 MG tablet; Take 1 tablet (20 mg total) by mouth once daily.  Dispense: 90 tablet; Refill: 1    Acute bronchitis, unspecified organism  -     albuterol (VENTOLIN HFA) 90 mcg/actuation inhaler; Inhale 2 puffs into the lungs every 4 (four) hours as needed for Wheezing. Rescue  Dispense: 18 g; Refill: 1    Old MI (myocardial infarction)  -     clopidogreL (PLAVIX) 75 mg tablet; Take 1 tablet (75 mg total) by mouth once daily.  Dispense: 90 tablet; Refill: 1    Benign prostatic hyperplasia with urinary obstruction  -     finasteride (PROSCAR) 5 mg tablet; Take 1 tablet (5 mg total) by mouth once daily.  Dispense: 90 tablet; Refill: 1  -     tamsulosin (FLOMAX) 0.4 mg Cap; TAKE ONE CAPSULE BY MOUTH EVERY DAY FOR PROSTATE  Dispense: 90 capsule; Refill: 1    Chronic pain due to trauma  -     gabapentin (NEURONTIN) 100 MG capsule; TAKE ONE CAPSULE BY MOUTH TWICE DAILY FOR NEUROPATHY-(NERVE PAIN)-  Dispense: 180 capsule; Refill: 1    Hyperlipidemia, unspecified hyperlipidemia type  -     pravastatin (PRAVACHOL) 40 MG tablet; Take 1 tablet (40 mg total) by mouth once daily.  Dispense: 90 tablet; Refill: 1    Insomnia, unspecified type  -     QUEtiapine (SEROQUEL) 25 MG Tab; Take 1 tablet (25 mg total) by mouth nightly.  Dispense: 90 tablet; Refill: 1    Need for vaccination  -     (In Office Administered) Tdap Vaccine    Abrasion, left lower leg, subsequent encounter   -     (In Office Administered) Tdap Vaccine       DM2:   - A1c is well controlled at this time.   - keep on insulin, Detemir 20u nightly at this time,  "and use increased dose of 30u for glucose over 225.    - had normal DM foot exam on left foot, right foor amputated.   - advise eye screen.    CKD4:    - monitoring, stable over last couple years.   - starting on low dose ARB, Losartan 12.5mg daily.     HLD:   - continue with statin per hx MI.    Vaccine:   - giving Tdap today, had small sores to leg and works in yard.     Colon Cancer Screen:   - encouraged to have screening, patient is not interested.  States, "If I've got it, I've got it."  Declines cologuard, Fit Kid and Colonoscopy.                  "

## 2022-07-13 NOTE — PATIENT INSTRUCTIONS
"  DM2:   - A1c is well controlled at this time.   - keep on insulin, Detemir 20u nightly at this time, and use increased dose of 30u for glucose over 225.    - had normal DM foot exam on left foot, right foor amputated.   - advise eye screen.    CKD4:    - monitoring, stable over last couple years.   - starting on low dose ARB, Losartan 12.5mg daily.     Vaccine:   - giving Tdap today, had small sores to leg and works in yard.     Colon Cancer Screen:   - encouraged to have screening, patient is not interested.  States, "If I've got it, I've got it."  Declines cologuard, Fit Kid and Colonoscopy.  "

## 2022-07-13 NOTE — PROGRESS NOTES
Verified pt ID using name and . Allergies cephalosporins, Iodine, Levofloxacin & PCN. Administered TDAP in right deltoid per physician order using aseptic technique. Aspirated and no blood return noted. Pt tolerated well with no adverse reactions noted.

## 2022-09-27 ENCOUNTER — PATIENT MESSAGE (OUTPATIENT)
Dept: PRIMARY CARE CLINIC | Facility: CLINIC | Age: 77
End: 2022-09-27
Payer: MEDICAID

## 2022-11-03 DIAGNOSIS — Z71.89 COMPLEX CARE COORDINATION: ICD-10-CM

## 2023-02-09 RX ORDER — CALCIUM CITRATE/VITAMIN D3 200MG-6.25
TABLET ORAL
Qty: 300 STRIP | Refills: 3 | Status: SHIPPED | OUTPATIENT
Start: 2023-02-09

## 2023-02-09 NOTE — TELEPHONE ENCOUNTER
Refill Decision Note   Gavin Cohen  is requesting a refill authorization.  Brief Assessment and Rationale for Refill:  Approve     Medication Therapy Plan:       Medication Reconciliation Completed: No   Comments:     Provider Staff:     Action is required for this patient.   Please see care gap opportunities below in Care Due Message.     Thanks!  [facility]     Appointments      Date Provider   Last Visit   7/13/2022 Otis Martinez MD   Next Visit   Visit date not found Otis Martinez MD     Note composed:5:35 PM 02/09/2023           Note composed:5:35 PM 02/09/2023

## 2023-02-09 NOTE — TELEPHONE ENCOUNTER
Care Due:                  Date            Visit Type   Department     Provider  --------------------------------------------------------------------------------                                EP -                              PRIMARY      [facility]  Last Visit: 07-      CARE ([facility])   PRIMARY CARE   Otis Martinez  Next Visit: None Scheduled  None         None Found                                                            Last  Test          Frequency    Reason                     Performed    Due Date  --------------------------------------------------------------------------------    HBA1C.......  6 months...  insulin..................  07- 01-    [facility] Embedded Care Gaps. Reference number: [id number]. 2/09/2023   5:00:25 PM CST

## 2023-03-06 ENCOUNTER — TELEPHONE (OUTPATIENT)
Dept: PRIMARY CARE CLINIC | Facility: CLINIC | Age: 78
End: 2023-03-06
Payer: MEDICAID

## 2023-03-06 NOTE — TELEPHONE ENCOUNTER
----- Message from Cristine Noriega sent at 3/6/2023  4:13 PM CST -----  Contact: Mahamed in law/Jennifer [phone]  Pt requesting orders for diabetic shoes sent to [facility] on [address]. Please advise when completed b/c pt is waiting to get shoes.

## 2023-03-09 ENCOUNTER — TELEPHONE (OUTPATIENT)
Dept: PRIMARY CARE CLINIC | Facility: CLINIC | Age: 78
End: 2023-03-09
Payer: MEDICAID

## 2023-03-09 NOTE — TELEPHONE ENCOUNTER
----- Message from Erica Fried sent at 3/9/2023  2:18 PM CST -----  Contact: Mahamed in law, Jennifer, [phone]  Calling to inquire about the request for a prescription for diabetic shoes. Please call her. Thanks.

## 2023-05-31 ENCOUNTER — PATIENT MESSAGE (OUTPATIENT)
Dept: ADMINISTRATIVE | Facility: HOSPITAL | Age: 78
End: 2023-05-31
Payer: MEDICARE

## 2023-06-03 DIAGNOSIS — Z71.89 COMPLEX CARE COORDINATION: ICD-10-CM

## 2023-06-15 DIAGNOSIS — E11.22 TYPE 2 DIABETES MELLITUS WITH STAGE 3B CHRONIC KIDNEY DISEASE, WITH LONG-TERM CURRENT USE OF INSULIN: ICD-10-CM

## 2023-06-15 DIAGNOSIS — N18.4 CKD (CHRONIC KIDNEY DISEASE) STAGE 4, GFR 15-29 ML/MIN: ICD-10-CM

## 2023-06-15 DIAGNOSIS — Z79.4 TYPE 2 DIABETES MELLITUS WITH STAGE 3B CHRONIC KIDNEY DISEASE, WITH LONG-TERM CURRENT USE OF INSULIN: ICD-10-CM

## 2023-06-15 DIAGNOSIS — N18.32 TYPE 2 DIABETES MELLITUS WITH STAGE 3B CHRONIC KIDNEY DISEASE, WITH LONG-TERM CURRENT USE OF INSULIN: ICD-10-CM

## 2023-06-15 RX ORDER — LOSARTAN POTASSIUM 25 MG/1
TABLET ORAL
Qty: 90 TABLET | Refills: 0 | Status: SHIPPED | OUTPATIENT
Start: 2023-06-15 | End: 2023-09-21 | Stop reason: SDUPTHER

## 2023-06-15 NOTE — TELEPHONE ENCOUNTER
Refill Routing Note   Medication(s) are not appropriate for processing by [facility] for the following reason(s):      Required labs abnormal    [facility] action(s):  Defer Care Due:  Appointment due 7/8/23  Labs due: A1C outdated; CBC, CMP, lipid panel due 7/3/23          Appointments  past 12m or future 3m with PCP    Date Provider   Last Visit   7/13/2022 Otis Martinez MD   Next Visit   Visit date not found Otis Martinez MD   ED visits in past 90 days: 0        Note composed:12:37 PM 06/15/2023

## 2023-06-15 NOTE — TELEPHONE ENCOUNTER
Care Due:                  Date            Visit Type   Department     Provider  --------------------------------------------------------------------------------                                 -                              PRIMARY      [facility]  Last Visit: 07-      CARE ([facility])   PRIMARY CARE   Otis Martinez  Next Visit: None Scheduled  None         None Found                                                            Last  Test          Frequency    Reason                     Performed    Due Date  --------------------------------------------------------------------------------    Office Visit  12 months..  albuterol, citalopram,     07- 07-                             clopidogreL, finasteride,                             insulin, losartan,                             nitroGLYCERIN,                             pravastatin, tamsulosin..    CBC.........  12 months..  clopidogreL..............  07- 07-    CMP.........  12 months..  insulin, losartan,         07- 07-                             pravastatin..............    HBA1C.......  6 months...  insulin..................  07- 01-    Lipid Panel.  12 months..  pravastatin..............  07- 07-    [facility] Embedded Care Due Messages. Reference number: [id number].   6/15/2023 11:01:14 AM CDT

## 2023-08-09 DIAGNOSIS — E11.22 TYPE 2 DIABETES MELLITUS WITH STAGE 3B CHRONIC KIDNEY DISEASE, WITH LONG-TERM CURRENT USE OF INSULIN: ICD-10-CM

## 2023-08-09 DIAGNOSIS — Z79.4 TYPE 2 DIABETES MELLITUS WITH STAGE 3B CHRONIC KIDNEY DISEASE, WITH LONG-TERM CURRENT USE OF INSULIN: ICD-10-CM

## 2023-08-09 DIAGNOSIS — N18.32 TYPE 2 DIABETES MELLITUS WITH STAGE 3B CHRONIC KIDNEY DISEASE, WITH LONG-TERM CURRENT USE OF INSULIN: ICD-10-CM

## 2023-08-09 NOTE — TELEPHONE ENCOUNTER
No care due was identified.  [facility] Embedded Care Due Messages. Reference number: [id number].   8/09/2023 5:06:15 PM CDT

## 2023-08-09 NOTE — TELEPHONE ENCOUNTER
Refill Routing Note   Medication(s) are not appropriate for processing by [facility] for the following reason(s):      Required labs outdated    [facility] action(s):  Defer Care Due:  None identified            Appointments  past 12m or future 3m with PCP    Date Provider   Last Visit   7/13/2022 Otis Martinez MD   Next Visit   Visit date not found Otis Martinez MD   ED visits in past 90 days: 0        Note composed:6:20 PM 08/09/2023

## 2023-08-10 RX ORDER — INSULIN DETEMIR 100 [IU]/ML
INJECTION, SOLUTION SUBCUTANEOUS
Qty: 15 ML | Refills: 0 | Status: SHIPPED | OUTPATIENT
Start: 2023-08-10 | End: 2023-09-21 | Stop reason: SDUPTHER

## 2023-08-23 DIAGNOSIS — G47.00 INSOMNIA, UNSPECIFIED TYPE: ICD-10-CM

## 2023-08-23 RX ORDER — QUETIAPINE FUMARATE 25 MG/1
25 TABLET, FILM COATED ORAL NIGHTLY
Qty: 30 TABLET | Refills: 2 | Status: SHIPPED | OUTPATIENT
Start: 2023-08-23 | End: 2023-09-21 | Stop reason: SDUPTHER

## 2023-08-23 NOTE — TELEPHONE ENCOUNTER
Care Due:                  Date            Visit Type   Department     Provider  --------------------------------------------------------------------------------                                 -                              PRIMARY      [facility]  Last Visit: 07-      CARE ([facility])   PRIMARY CARE   Otis Martinez  Next Visit: None Scheduled  None         None Found                                                            Last  Test          Frequency    Reason                     Performed    Due Date  --------------------------------------------------------------------------------    Office Visit  12 months..  albuterol, citalopram,     07- 07-                             clopidogreL, finasteride,                             insulin, losartan,                             nitroGLYCERIN,                             pravastatin, tamsulosin..    CBC.........  12 months..  clopidogreL..............  07- 07-    CMP.........  12 months..  insulin, losartan,         07- 07-                             pravastatin..............    HBA1C.......  6 months...  insulin..................  07- 01-    Lipid Panel.  12 months..  pravastatin..............  07- 07-    [facility] Embedded Care Due Messages. Reference number: [id number].   8/23/2023 4:02:45 PM CDT

## 2023-09-18 ENCOUNTER — PATIENT MESSAGE (OUTPATIENT)
Dept: PRIMARY CARE CLINIC | Facility: CLINIC | Age: 78
End: 2023-09-18
Payer: MEDICARE

## 2023-09-21 ENCOUNTER — OFFICE VISIT (OUTPATIENT)
Dept: PRIMARY CARE CLINIC | Facility: CLINIC | Age: 78
End: 2023-09-21
Payer: MEDICARE

## 2023-09-21 VITALS
SYSTOLIC BLOOD PRESSURE: 110 MMHG | HEART RATE: 79 BPM | DIASTOLIC BLOOD PRESSURE: 70 MMHG | TEMPERATURE: 97 F | OXYGEN SATURATION: 95 % | RESPIRATION RATE: 16 BRPM | HEIGHT: 68 IN | BODY MASS INDEX: 29.52 KG/M2 | WEIGHT: 194.75 LBS

## 2023-09-21 DIAGNOSIS — E11.22 TYPE 2 DIABETES MELLITUS WITH STAGE 3B CHRONIC KIDNEY DISEASE, WITH LONG-TERM CURRENT USE OF INSULIN: Primary | ICD-10-CM

## 2023-09-21 DIAGNOSIS — G89.21 CHRONIC PAIN DUE TO TRAUMA: ICD-10-CM

## 2023-09-21 DIAGNOSIS — E78.5 HYPERLIPIDEMIA, UNSPECIFIED HYPERLIPIDEMIA TYPE: ICD-10-CM

## 2023-09-21 DIAGNOSIS — I25.2 HX OF MYOCARDIAL INFARCTION: ICD-10-CM

## 2023-09-21 DIAGNOSIS — I25.2 OLD MI (MYOCARDIAL INFARCTION): ICD-10-CM

## 2023-09-21 DIAGNOSIS — N40.1 BENIGN PROSTATIC HYPERPLASIA WITH URINARY OBSTRUCTION: ICD-10-CM

## 2023-09-21 DIAGNOSIS — N18.4 CKD (CHRONIC KIDNEY DISEASE) STAGE 4, GFR 15-29 ML/MIN: ICD-10-CM

## 2023-09-21 DIAGNOSIS — Z12.5 ENCOUNTER FOR SCREENING FOR MALIGNANT NEOPLASM OF PROSTATE: ICD-10-CM

## 2023-09-21 DIAGNOSIS — G47.00 INSOMNIA, UNSPECIFIED TYPE: ICD-10-CM

## 2023-09-21 DIAGNOSIS — F32.1 CURRENT MODERATE EPISODE OF MAJOR DEPRESSIVE DISORDER WITHOUT PRIOR EPISODE: ICD-10-CM

## 2023-09-21 DIAGNOSIS — Z79.4 TYPE 2 DIABETES MELLITUS WITH STAGE 3B CHRONIC KIDNEY DISEASE, WITH LONG-TERM CURRENT USE OF INSULIN: Primary | ICD-10-CM

## 2023-09-21 DIAGNOSIS — E74.819 DISORDERS OF GLUCOSE TRANSPORT, UNSPECIFIED: ICD-10-CM

## 2023-09-21 DIAGNOSIS — N13.8 BENIGN PROSTATIC HYPERPLASIA WITH URINARY OBSTRUCTION: ICD-10-CM

## 2023-09-21 DIAGNOSIS — Z72.0 TOBACCO USE: ICD-10-CM

## 2023-09-21 DIAGNOSIS — N18.32 TYPE 2 DIABETES MELLITUS WITH STAGE 3B CHRONIC KIDNEY DISEASE, WITH LONG-TERM CURRENT USE OF INSULIN: Primary | ICD-10-CM

## 2023-09-21 DIAGNOSIS — Z89.511 S/P BKA (BELOW KNEE AMPUTATION), RIGHT: ICD-10-CM

## 2023-09-21 DIAGNOSIS — Z12.11 COLON CANCER SCREENING: ICD-10-CM

## 2023-09-21 PROCEDURE — 99214 OFFICE O/P EST MOD 30 MIN: CPT | Mod: PBBFAC,PN | Performed by: STUDENT IN AN ORGANIZED HEALTH CARE EDUCATION/TRAINING PROGRAM

## 2023-09-21 PROCEDURE — 99214 OFFICE O/P EST MOD 30 MIN: CPT | Mod: S$PBB,,, | Performed by: STUDENT IN AN ORGANIZED HEALTH CARE EDUCATION/TRAINING PROGRAM

## 2023-09-21 PROCEDURE — 99999 PR PBB SHADOW E&M-EST. PATIENT-LVL IV: ICD-10-PCS | Mod: PBBFAC,,, | Performed by: STUDENT IN AN ORGANIZED HEALTH CARE EDUCATION/TRAINING PROGRAM

## 2023-09-21 PROCEDURE — 99214 PR OFFICE/OUTPT VISIT, EST, LEVL IV, 30-39 MIN: ICD-10-PCS | Mod: S$PBB,,, | Performed by: STUDENT IN AN ORGANIZED HEALTH CARE EDUCATION/TRAINING PROGRAM

## 2023-09-21 PROCEDURE — 99999 PR PBB SHADOW E&M-EST. PATIENT-LVL IV: CPT | Mod: PBBFAC,,, | Performed by: STUDENT IN AN ORGANIZED HEALTH CARE EDUCATION/TRAINING PROGRAM

## 2023-09-21 RX ORDER — INSULIN DETEMIR 100 [IU]/ML
INJECTION, SOLUTION SUBCUTANEOUS
Qty: 15 ML | Refills: 2 | Status: SHIPPED | OUTPATIENT
Start: 2023-09-21 | End: 2023-12-22

## 2023-09-21 RX ORDER — GABAPENTIN 100 MG/1
CAPSULE ORAL
Qty: 90 CAPSULE | Refills: 3 | Status: SHIPPED | OUTPATIENT
Start: 2023-09-21

## 2023-09-21 RX ORDER — NITROGLYCERIN 0.4 MG/1
0.4 TABLET SUBLINGUAL EVERY 5 MIN PRN
Qty: 15 TABLET | Refills: 0 | Status: SHIPPED | OUTPATIENT
Start: 2023-09-21

## 2023-09-21 RX ORDER — QUETIAPINE FUMARATE 25 MG/1
25 TABLET, FILM COATED ORAL NIGHTLY
Qty: 90 TABLET | Refills: 3 | Status: SHIPPED | OUTPATIENT
Start: 2023-09-21

## 2023-09-21 RX ORDER — CITALOPRAM 20 MG/1
20 TABLET, FILM COATED ORAL DAILY
Qty: 90 TABLET | Refills: 3 | Status: SHIPPED | OUTPATIENT
Start: 2023-09-21 | End: 2024-02-05

## 2023-09-21 RX ORDER — PRAVASTATIN SODIUM 40 MG/1
40 TABLET ORAL DAILY
Qty: 90 TABLET | Refills: 3 | Status: SHIPPED | OUTPATIENT
Start: 2023-09-21

## 2023-09-21 RX ORDER — CLOPIDOGREL BISULFATE 75 MG/1
75 TABLET ORAL DAILY
Qty: 90 TABLET | Refills: 3 | Status: SHIPPED | OUTPATIENT
Start: 2023-09-21

## 2023-09-21 RX ORDER — LOSARTAN POTASSIUM 25 MG/1
12.5 TABLET ORAL DAILY
Qty: 90 TABLET | Refills: 3 | Status: SHIPPED | OUTPATIENT
Start: 2023-09-21

## 2023-09-21 NOTE — PATIENT INSTRUCTIONS
Annual exam:   - patient reestablishing care, presents with his daughter-in-law, Jnenifer.  They state that patient has not been keeping close follow-up on his medical conditions.  Has not seen Cardiology, Urology or ortho for protracted period.   - getting blood work, fasting, at next availability.  Refilling meds today.    Diabetes:   - patient takes long-acting insulin, but had been taking 40 units some nights, and 0 units other nights depending on whether his blood sugars were in the 200s or 100s.  Advised that this has not inadequate titration.   - advised to the following insulin regimen:     Take detemir/Levemir, 15 units nightly, increase to 20 units if blood sugar over 200, or increase to 25 units if blood sugar over 250.  If blood sugar under 100 only take 8 units.  - continue taking blood sugar 1 time in the morning and 1 time in the evening, keep this in a log, right down how much insulin has been being used in the evening and bring this to your follow-up appointment.  If possible take a picture of this over the next 2 weeks and send the imaged through the [employer].      BPH:   - patient noted to have urinary hesitancy, urinary frequency/nocturia.  Has been taking tamsulosin 0.4 mg daily as well as finasteride.  Has not seen Urology for over a year.  Referral placed to Urology for appointment.    Right lower extremity BKA:   - patient requesting diabetic shoes, had seen [facility] on [address] [facility] [address], will place referral for patient to be seen and get new orthotics.   - patient takes gabapentin 100 mg as needed once daily for phantom limb pain on the right lower extremity.    Tobacco use:   - patient is a long-time smoker, had smoked for approximately 40 years, then stopped for almost 20 years, then started smoking again 3 years ago.  Has not had lung screening, would like to get this completed.  Order placed for low-dose lung CT scan.  Patient is working on smoking cessation.    Health  maintenance:   - patient has never had colon cancer screening, he is above age of 74 but on discussion would like to have screening.  Ordering Cologuard at this time.  Does not have history

## 2023-09-21 NOTE — PROGRESS NOTES
Subjective:           Patient ID: Gavin Cohen Sr. is a 77 y.o. male who presents today with a chief complaint of No chief complaint on file.  .    Chief Complaint:   No chief complaint on file.      History of Present Illness:    Gavin Cohen Sr. is a 77 y.o. male who presents today with a chief complaint of No chief complaint on file.  .  Presenting today with daughter-in-law who is helping to care for patient at this time. Would like to get all updated.    Needing to get a check up.  Intested in getting lungs checked and possible imaging.     Looking to get meds refilled and reviewed.    Would like to get DM shoes checked and replaced.    DM:  has been checking glucose in the PM then take meds if elevated or not if normal.  Checks again in AM and takes PRN.    Taking Oxycodine 30mg x 4 daily from Dr. Yasmin Cuenca, for leg and back pain.  Has been having frequent pain in his back.  Tolerating the opiates well in regard to not getting constipated.            Review of Systems   Constitutional:  Positive for fatigue. Negative for chills and fever.   HENT:  Negative for ear pain, postnasal drip and sinus pain.    Respiratory:  Negative for cough and shortness of breath.    Cardiovascular:  Negative for chest pain.   Gastrointestinal:  Negative for constipation, diarrhea, nausea and vomiting.   Genitourinary:  Positive for difficulty urinating. Negative for dysuria, frequency and urgency.   Musculoskeletal:  Positive for arthralgias, back pain, gait problem and myalgias.   Neurological:  Positive for weakness and numbness. Negative for dizziness, light-headedness and headaches.   Psychiatric/Behavioral:  Positive for sleep disturbance. Negative for agitation and confusion.            Objective:        Vitals:    09/21/23 1155   BP: 110/70   BP Location: Right arm   Patient Position: Sitting   BP Method: Medium (Manual)   Pulse: 79   Resp: 16   Temp: 97 °F (36.1 °C)   TempSrc: Temporal   SpO2: 95%   Weight: 88.3 kg  "(194 lb 12.4 oz)   Height: 5' 8" (1.727 m)       Body mass index is 29.62 kg/m².      Physical Exam  Vitals reviewed. Exam conducted with a chaperone present.   Constitutional:       Appearance: Normal appearance. He is not toxic-appearing.      Comments: As per BMI.  Wheelchair-bound patient with a right-sided BKA.  Patient escorted by his daughter to appointment.   HENT:      Head: Normocephalic and atraumatic.      Right Ear: External ear normal.      Left Ear: External ear normal.   Eyes:      Extraocular Movements: Extraocular movements intact.      Conjunctiva/sclera: Conjunctivae normal.   Cardiovascular:      Rate and Rhythm: Normal rate and regular rhythm.      Pulses: Normal pulses.   Pulmonary:      Effort: Pulmonary effort is normal.      Breath sounds: No rhonchi.   Abdominal:      General: Bowel sounds are normal.      Palpations: Abdomen is soft.      Tenderness: There is no right CVA tenderness or left CVA tenderness.   Musculoskeletal:         General: No swelling.      Cervical back: Normal range of motion.      Left lower leg: No edema.      Comments: Right lower extremity is and prosthesis.    Has plastic molded cup holding distal stump.    Patient reports having difficulty with his shoes currently, is not stable in his feet.  Would need to get replacement for his diabetic shoes if possible.   Skin:     General: Skin is warm.      Capillary Refill: Capillary refill takes less than 2 seconds.      Coloration: Skin is not jaundiced.      Comments: Diabetic foot exam was completed, patient is missing right foot, left foot is missing 1st and 2nd digits.    Pulse intact.  Sensation intact.    Neurological:      General: No focal deficit present.      Mental Status: He is alert and oriented to person, place, and time.      Motor: Weakness present.      Gait: Gait abnormal.   Psychiatric:         Mood and Affect: Mood normal.             Lab Results   Component Value Date     07/08/2022    K 5.1 " "07/08/2022     07/08/2022    CO2 23 07/08/2022    BUN 46 (H) 07/08/2022    CREATININE 2.1 (H) 07/08/2022    ANIONGAP 8 07/08/2022     Lab Results   Component Value Date    HGBA1C 7.3 (H) 07/08/2022     Lab Results   Component Value Date    BNP 66 09/23/2020       Lab Results   Component Value Date    WBC 7.89 07/08/2022    HGB 11.2 (L) 07/08/2022    HCT 34.4 (L) 07/08/2022     (L) 07/08/2022    GRAN 4.5 07/08/2022    GRAN 56.6 07/08/2022     Lab Results   Component Value Date    CHOL 108 (L) 07/08/2022    HDL 35 (L) 07/08/2022    LDLCALC 63.2 07/08/2022    TRIG 49 07/08/2022          Current Outpatient Medications:     albuterol (PROVENTIL/VENTOLIN HFA) 90 mcg/actuation inhaler, Inhale 2 puffs into the lungs every 4 (four) hours as needed for Wheezing. Rescue, Disp: 54 g, Rfl: 3    aspirin (ECOTRIN) 81 MG EC tablet, Take 1 tablet (81 mg total) by mouth once daily., Disp: 90 tablet, Rfl: 0    citalopram (CELEXA) 20 MG tablet, Take 1 tablet (20 mg total) by mouth once daily., Disp: 90 tablet, Rfl: 3    clopidogreL (PLAVIX) 75 mg tablet, Take 1 tablet (75 mg total) by mouth once daily., Disp: 90 tablet, Rfl: 3    COMFORT EZ PEN NEEDLES 33 gauge x 3/16" Ndle, USE WITH BASAGLAR, Disp: 100 each, Rfl: 5    finasteride (PROSCAR) 5 mg tablet, TAKE 1 TABLET (5 MG TOTAL) BY MOUTH ONCE DAILY., Disp: 90 tablet, Rfl: 2    gabapentin (NEURONTIN) 100 MG capsule, Take 1 tab orally per day as needed for leg pain or phantom limb pain., Disp: 90 capsule, Rfl: 3    insulin detemir U-100, Levemir, (LEVEMIR FLEXPEN) 100 unit/mL (3 mL) InPn pen, Take 15u nightly, can add 10u if sugar over 200, or add 15u if sugar over 250. If sugar under 100, take 8u., Disp: 15 mL, Rfl: 2    losartan (COZAAR) 25 MG tablet, Take 0.5 tablets (12.5 mg total) by mouth once daily., Disp: 90 tablet, Rfl: 3    nitroGLYCERIN (NITROSTAT) 0.4 MG SL tablet, Place 1 tablet (0.4 mg total) under the tongue every 5 (five) minutes as needed for Chest pain., " "Disp: 15 tablet, Rfl: 0    oxyCODONE (ROXICODONE) 30 MG Tab, Take 30 mg by mouth every 8 (eight) hours as needed., Disp: , Rfl:     pravastatin (PRAVACHOL) 40 MG tablet, Take 1 tablet (40 mg total) by mouth once daily., Disp: 90 tablet, Rfl: 3    QUEtiapine (SEROQUEL) 25 MG Tab, Take 1 tablet (25 mg total) by mouth nightly., Disp: 90 tablet, Rfl: 3    tamsulosin (FLOMAX) 0.4 mg Cap, TAKE ONE CAPSULE BY MOUTH EVERY DAY FOR PROSTATE, Disp: 90 capsule, Rfl: 1    TRUE METRIX GLUCOSE TEST STRIP Strp, TEST FOR BLOOD SUGAR THREE TIMES DAILY, Disp: 300 strip, Rfl: 3    UNIFINE PENTIPS 32 gauge x 5/32" Ndle, USE WITH INSULIN, Disp: 100 each, Rfl: 3     Outpatient Encounter Medications as of 9/21/2023   Medication Sig Dispense Refill    albuterol (PROVENTIL/VENTOLIN HFA) 90 mcg/actuation inhaler Inhale 2 puffs into the lungs every 4 (four) hours as needed for Wheezing. Rescue 54 g 3    aspirin (ECOTRIN) 81 MG EC tablet Take 1 tablet (81 mg total) by mouth once daily. 90 tablet 0    citalopram (CELEXA) 20 MG tablet Take 1 tablet (20 mg total) by mouth once daily. 90 tablet 3    clopidogreL (PLAVIX) 75 mg tablet Take 1 tablet (75 mg total) by mouth once daily. 90 tablet 3    COMFORT EZ PEN NEEDLES 33 gauge x 3/16" Ndle USE WITH BASAGLAR 100 each 5    finasteride (PROSCAR) 5 mg tablet TAKE 1 TABLET (5 MG TOTAL) BY MOUTH ONCE DAILY. 90 tablet 2    gabapentin (NEURONTIN) 100 MG capsule Take 1 tab orally per day as needed for leg pain or phantom limb pain. 90 capsule 3    insulin detemir U-100, Levemir, (LEVEMIR FLEXPEN) 100 unit/mL (3 mL) InPn pen Take 15u nightly, can add 10u if sugar over 200, or add 15u if sugar over 250. If sugar under 100, take 8u. 15 mL 2    losartan (COZAAR) 25 MG tablet Take 0.5 tablets (12.5 mg total) by mouth once daily. 90 tablet 3    nitroGLYCERIN (NITROSTAT) 0.4 MG SL tablet Place 1 tablet (0.4 mg total) under the tongue every 5 (five) minutes as needed for Chest pain. 15 tablet 0    oxyCODONE " "(ROXICODONE) 30 MG Tab Take 30 mg by mouth every 8 (eight) hours as needed.      pravastatin (PRAVACHOL) 40 MG tablet Take 1 tablet (40 mg total) by mouth once daily. 90 tablet 3    QUEtiapine (SEROQUEL) 25 MG Tab Take 1 tablet (25 mg total) by mouth nightly. 90 tablet 3    tamsulosin (FLOMAX) 0.4 mg Cap TAKE ONE CAPSULE BY MOUTH EVERY DAY FOR PROSTATE 90 capsule 1    TRUE METRIX GLUCOSE TEST STRIP Strp TEST FOR BLOOD SUGAR THREE TIMES DAILY 300 strip 3    UNIFINE PENTIPS 32 gauge x 5/32" Ndle USE WITH INSULIN 100 each 3    [DISCONTINUED] citalopram (CELEXA) 20 MG tablet TAKE 1 TABLET (20 MG TOTAL) BY MOUTH ONCE DAILY. 90 tablet 2    [DISCONTINUED] clopidogreL (PLAVIX) 75 mg tablet TAKE 1 TABLET (75 MG TOTAL) BY MOUTH ONCE DAILY. 90 tablet 1    [DISCONTINUED] gabapentin (NEURONTIN) 100 MG capsule TAKE ONE CAPSULE BY MOUTH TWICE DAILY FOR NEUROPATHY-(NERVE PAIN)- 180 capsule 1    [DISCONTINUED] insulin detemir U-100, Levemir, (LEVEMIR FLEXPEN) 100 unit/mL (3 mL) InPn pen INJECT 20 UNITS SUBCUTANEOUSLY AT BEDTIME, PLUS HIGH DOSE SSI ADDING 10U IF GLUCOSE IS OVER 225. 15 mL 0    [DISCONTINUED] losartan (COZAAR) 25 MG tablet TAKE 1/2 TABLET BY MOUTH DAILY 90 tablet 0    [DISCONTINUED] nitroGLYCERIN (NITROSTAT) 0.4 MG SL tablet Place 1 tablet (0.4 mg total) under the tongue every 5 (five) minutes as needed for Chest pain. 15 tablet 0    [DISCONTINUED] pravastatin (PRAVACHOL) 40 MG tablet TAKE 1 TABLET (40 MG TOTAL) BY MOUTH ONCE DAILY. 90 tablet 2    [DISCONTINUED] QUEtiapine (SEROQUEL) 25 MG Tab TAKE 1 TABLET (25 MG TOTAL) BY MOUTH NIGHTLY. 30 tablet 2     No facility-administered encounter medications on file as of 9/21/2023.          Assessment:       1. Type 2 diabetes mellitus with stage 3b chronic kidney disease, with long-term current use of insulin    2. 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 Old MI (myocardial infarction)    4. Hx of myocardial infarction    5. CKD (chronic kidney disease) stage 4, GFR 15-29 ml/min  "   6. Current moderate episode of major depressive disorder without prior episode    7. Insomnia, unspecified type    8. Hyperlipidemia, unspecified hyperlipidemia type    9. Tobacco use    10. Benign prostatic hyperplasia with urinary obstruction    11. Disorders of glucose transport, unspecified    12. Encounter for screening for malignant neoplasm of prostate    13. Colon cancer screening    14. S/P BKA (below knee amputation), right           Plan:       Type 2 diabetes mellitus with stage 3b chronic kidney disease, with long-term current use of insulin  -     losartan (COZAAR) 25 MG tablet; Take 0.5 tablets (12.5 mg total) by mouth once daily.  Dispense: 90 tablet; Refill: 3  -     insulin detemir U-100, Levemir, (LEVEMIR FLEXPEN) 100 unit/mL (3 mL) InPn pen; Take 15u nightly, can add 10u if sugar over 200, or add 15u if sugar over 250. If sugar under 100, take 8u.  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  Dispense: 90 capsule; Refill: 3    Old MI (myocardial infarction)  -     nitroGLYCERIN (NITROSTAT) 0.4 MG SL tablet; Place 1 tablet (0.4 mg total) under the tongue every 5 (five) minutes as needed for Chest pain.  Dispense: 15 tablet; Refill: 0  -     clopidogreL (PLAVIX) 75 mg tablet; Take 1 tablet (75 mg total) by mouth once daily.  Dispense: 90 tablet; Refill: 3    Hx of myocardial infarction  -     nitroGLYCERIN (NITROSTAT) 0.4 MG SL tablet; Place 1 tablet (0.4 mg total) under the tongue every 5  (five) minutes as needed for Chest pain.  Dispense: 15 tablet; Refill: 0  -     CBC Auto Differential; Future; Expected date: 09/21/2023  -     Comprehensive Metabolic Panel; Future; Expected date: 09/21/2023  -     Lipid Panel; Future; Expected date: 09/21/2023  -     Hemoglobin A1C; Future; Expected date: 09/21/2023  -     TSH; Future; Expected date: 09/21/2023  -     PSA, Screening; Future; Expected date: 09/21/2023    CKD (chronic kidney disease) stage 4, GFR 15-29 ml/min  -     losartan (COZAAR) 25 MG tablet; Take 0.5 tablets (12.5 mg total) by mouth once daily.  Dispense: 90 tablet; Refill: 3  -     Ambulatory referral/consult to Urology; Future; Expected date: 09/28/2023  -     CBC Auto Differential; Future; Expected date: 09/21/2023  -     Comprehensive Metabolic Panel; Future; Expected date: 09/21/2023  -     Lipid Panel; Future; Expected date: 09/21/2023  -     Hemoglobin A1C; Future; Expected date: 09/21/2023  -     TSH; Future; Expected date: 09/21/2023  -     PSA, Screening; Future; Expected date: 09/21/2023    Current moderate episode of major depressive disorder without prior episode  -     citalopram (CELEXA) 20 MG tablet; Take 1 tablet (20 mg total) by mouth once daily.  Dispense: 90 tablet; Refill: 3  -     CBC Auto Differential; Future; Expected date: 09/21/2023  -     Comprehensive Metabolic Panel; Future; Expected date: 09/21/2023  -     Lipid Panel; Future; Expected date: 09/21/2023  -     Hemoglobin A1C; Future; Expected date: 09/21/2023  -     TSH; Future; Expected date: 09/21/2023  -     PSA, Screening; Future; Expected date: 09/21/2023    Insomnia, unspecified type  -     QUEtiapine (SEROQUEL) 25 MG Tab; Take 1 tablet (25 mg total) by mouth nightly.  Dispense: 90 tablet; Refill: 3    Hyperlipidemia, unspecified hyperlipidemia type  -     pravastatin (PRAVACHOL) 40 MG tablet; Take 1 tablet (40 mg total) by mouth once daily.  Dispense: 90 tablet; Refill: 3  -     CBC Auto Differential;  Future; Expected date: 09/21/2023  -     Comprehensive Metabolic Panel; Future; Expected date: 09/21/2023  -     Lipid Panel; Future; Expected date: 09/21/2023  -     Hemoglobin A1C; Future; Expected date: 09/21/2023  -     TSH; Future; Expected date: 09/21/2023  -     PSA, Screening; Future; Expected date: 09/21/2023    Tobacco use  -     CT Chest Lung Screening Low Dose; Future; Expected date: 09/21/2023    Benign prostatic hyperplasia with urinary obstruction  -     Ambulatory referral/consult to Urology; Future; Expected date: 09/28/2023    Disorders of glucose transport, unspecified  -     Hemoglobin A1C; Future; Expected date: 09/21/2023    Encounter for screening for malignant neoplasm of prostate  -     PSA, Screening; Future; Expected date: 09/21/2023    Colon cancer screening  -     Cologuard Screening (Multitarget Stool DNA); Future; Expected date: 09/21/2023    S/P BKA (below knee amputation), right  -     DIABETIC SHOES FOR HOME USE               Annual exam:   - patient reestablishing care, presents with his daughter-in-law, Jennifer.  They state that patient has not been keeping close follow-up on his medical conditions.  Has not seen Cardiology, Urology or ortho for protracted period.   - getting blood work, fasting, at next availability.  Refilling meds today.    Diabetes:   - patient takes long-acting insulin, but had been taking 40 units some nights, and 0 units other nights depending on whether his blood sugars were in the 200s or 100s.  Advised that this has not inadequate titration.   - advised to the following insulin regimen:     Take detemir/Levemir, 15 units nightly, increase to 20 units if blood sugar over 200, or increase to 25 units if blood sugar over 250.  If blood sugar under 100 only take 8 units.  - continue taking blood sugar 1 time in the morning and 1 time in the evening, keep this in a log, right down how much insulin has been being used in the evening and bring this to your  follow-up appointment.  If possible take a picture of this over the next 2 weeks and send the imaged through the [employer] messenger.      BPH:   - patient noted to have urinary hesitancy, urinary frequency/nocturia.  Has been taking tamsulosin 0.4 mg daily as well as finasteride.  Has not seen Urology for over a year.  Referral placed to Urology for appointment.    Right lower extremity BKA:   - patient requesting diabetic shoes, had seen [facility] on [facility], will place referral for patient to be seen and get new orthotics.   - patient has gait instability, shoes are not in good condition.  Requesting to get these replaced.  Has chronic pain to lower back as well as to lower extremities.  Has weakness and instability.   - patient takes gabapentin 100 mg as needed once daily for phantom limb pain on the right lower extremity.    Tobacco use:   - patient is a long-time smoker, had smoked for approximately 40 years, then stopped for almost 20 years, then started smoking again 3 years ago.  Has not had lung screening, would like to get this completed.  Order placed for low-dose lung CT scan.  Patient is working on smoking cessation.    Health maintenance:   - patient has never had colon cancer screening, he is above age of 74 but on discussion would like to have screening.  Ordering Cologuard at this time.  Does not have history

## 2023-09-26 ENCOUNTER — TELEPHONE (OUTPATIENT)
Dept: PRIMARY CARE CLINIC | Facility: CLINIC | Age: 78
End: 2023-09-26
Payer: MEDICARE

## 2023-10-06 ENCOUNTER — PATIENT MESSAGE (OUTPATIENT)
Dept: PRIMARY CARE CLINIC | Facility: CLINIC | Age: 78
End: 2023-10-06
Payer: MEDICARE

## 2023-10-09 ENCOUNTER — OFFICE VISIT (OUTPATIENT)
Dept: UROLOGY | Facility: CLINIC | Age: 78
End: 2023-10-09
Payer: MEDICARE

## 2023-10-09 ENCOUNTER — TELEPHONE (OUTPATIENT)
Dept: PRIMARY CARE CLINIC | Facility: CLINIC | Age: 78
End: 2023-10-09
Payer: MEDICARE

## 2023-10-09 ENCOUNTER — PATIENT MESSAGE (OUTPATIENT)
Dept: PRIMARY CARE CLINIC | Facility: CLINIC | Age: 78
End: 2023-10-09
Payer: MEDICARE

## 2023-10-09 VITALS
DIASTOLIC BLOOD PRESSURE: 75 MMHG | WEIGHT: 196.19 LBS | BODY MASS INDEX: 29.73 KG/M2 | SYSTOLIC BLOOD PRESSURE: 147 MMHG | HEART RATE: 80 BPM | HEIGHT: 68 IN

## 2023-10-09 DIAGNOSIS — N13.8 BENIGN PROSTATIC HYPERPLASIA WITH URINARY OBSTRUCTION: ICD-10-CM

## 2023-10-09 DIAGNOSIS — N18.4 CKD (CHRONIC KIDNEY DISEASE) STAGE 4, GFR 15-29 ML/MIN: ICD-10-CM

## 2023-10-09 DIAGNOSIS — N40.1 BENIGN PROSTATIC HYPERPLASIA WITH URINARY OBSTRUCTION: ICD-10-CM

## 2023-10-09 PROCEDURE — 99999 PR PBB SHADOW E&M-EST. PATIENT-LVL IV: CPT | Mod: PBBFAC,,, | Performed by: STUDENT IN AN ORGANIZED HEALTH CARE EDUCATION/TRAINING PROGRAM

## 2023-10-09 PROCEDURE — 99204 PR OFFICE/OUTPT VISIT, NEW, LEVL IV, 45-59 MIN: ICD-10-PCS | Mod: S$PBB,,, | Performed by: STUDENT IN AN ORGANIZED HEALTH CARE EDUCATION/TRAINING PROGRAM

## 2023-10-09 PROCEDURE — 99204 OFFICE O/P NEW MOD 45 MIN: CPT | Mod: S$PBB,,, | Performed by: STUDENT IN AN ORGANIZED HEALTH CARE EDUCATION/TRAINING PROGRAM

## 2023-10-09 PROCEDURE — 99214 OFFICE O/P EST MOD 30 MIN: CPT | Mod: PBBFAC,PN | Performed by: STUDENT IN AN ORGANIZED HEALTH CARE EDUCATION/TRAINING PROGRAM

## 2023-10-09 PROCEDURE — 99999 PR PBB SHADOW E&M-EST. PATIENT-LVL IV: ICD-10-PCS | Mod: PBBFAC,,, | Performed by: STUDENT IN AN ORGANIZED HEALTH CARE EDUCATION/TRAINING PROGRAM

## 2023-10-09 NOTE — PROGRESS NOTES
"[facility] Urology [facility] [address]   Clinic Note    SUBJECTIVE:     Chief Complaint: checkup    History of Present Illness:  Gavin Cohen Sr. is a 77 y.o. male who presents to clinic for a checkupHe is new to our clinic referred by Dr. Otis Martinez.     He is here with his daughter-in-law. They are concerned primarily about his CKD. Review of his labs shows that Cr is 2.0, which is fairly stable (was 2.2 in 2018.) No abdominal imaging is available recently.    Patient is on Flomax and finasteride. Reports nocturia x 3-4, some daytime frequency, but is very happy with how he voids. Denies gross hematuria.    Anticoagulation:  Yes ASA/Plavix    OBJECTIVE:     Estimated body mass index is 29.83 kg/m² as calculated from the following:    Height as of this encounter: 5' 8" (1.727 m).    Weight as of this encounter: 89 kg (196 lb 3.4 oz).    Vital Signs (Most Recent)  Pulse: 80 (10/09/23 1020)  BP: (!) 147/75 (10/09/23 1020)    Physical Exam  Eyes:      Conjunctiva/sclera: Conjunctivae normal.   Cardiovascular:      Rate and Rhythm: Normal rate.   Pulmonary:      Effort: Pulmonary effort is normal.   Abdominal:      General: Abdomen is flat. There is no distension.      Tenderness: There is no abdominal tenderness.   Musculoskeletal:      Cervical back: Normal range of motion.      Comments: Right BKA; in wheelchair   Skin:     General: Skin is warm and dry.   Neurological:      General: No focal deficit present.      Mental Status: He is alert and oriented to person, place, and time.   Psychiatric:         Mood and Affect: Mood normal.         Behavior: Behavior normal.         Thought Content: Thought content normal.         Judgment: Judgment normal.         Lab Results   Component Value Date    BUN 51 (H) 10/04/2023    CREATININE 2.0 (H) 10/04/2023    WBC 7.06 10/04/2023    HGB 11.0 (L) 10/04/2023    HCT 34.2 (L) 10/04/2023     (L) 10/04/2023    AST 14 10/04/2023    ALT 12 10/04/2023    ALKPHOS 69 10/04/2023 "    ALBUMIN 3.3 (L) 10/04/2023    HGBA1C 7.7 (H) 10/04/2023        Lab Results   Component Value Date    PSA <0.01 10/04/2023    PSA 0.08 07/08/2022         ASSESSMENT     1. CKD (chronic kidney disease) stage 4, GFR 15-29 ml/min    2. Benign prostatic hyperplasia with urinary obstruction      PLAN:   1. CKD (chronic kidney disease) stage 4, GFR 15-29 ml/min  -     Ambulatory referral/consult to Urology  -     Ambulatory referral/consult to Nephrology; Future; Expected date: 10/16/2023    2. Benign prostatic hyperplasia with urinary obstruction  -     Ambulatory referral/consult to Urology     For BPH, continue Flomax and finasteride.   Patient's PSA is undetectable on finasteride. OK to stop PSA screening.   RTC PRN    Mayo Reyes MD     Letter to Otis Martinez MD

## 2023-10-10 LAB — NONINV COLON CA DNA+OCC BLD SCRN STL QL: NORMAL

## 2023-10-10 NOTE — TELEPHONE ENCOUNTER
I sent the custom shoe order to [facility], but they do not do custom shoes.  They only do standard shoes with custom insoles.  Can you put in a new referral for the standard shoes.

## 2023-10-11 ENCOUNTER — TELEPHONE (OUTPATIENT)
Dept: PRIMARY CARE CLINIC | Facility: CLINIC | Age: 78
End: 2023-10-11
Payer: MEDICARE

## 2023-10-11 NOTE — TELEPHONE ENCOUNTER
----- Message from Cora Alan sent at 10/11/2023  2:03 PM CDT -----  Contact: Maria Luisa/Carol/[phone]  [email or web address]     Patient is calling for Medical Advice regarding:Dr Martinez is still listed as a student doctor in [place] system       Would like response via FunCaptcha:  Would like a return call     Comments:Carol said that she cannot move forward with pt's order because Dr Martinez is still coming up as a student ,I assured her that he is a staffed doctor , she stated that she cannot go by my verbal she needs to have official documentation or the doctor needs to go in and update his information. Please advise

## 2023-10-17 ENCOUNTER — TELEPHONE (OUTPATIENT)
Dept: PRIMARY CARE CLINIC | Facility: CLINIC | Age: 78
End: 2023-10-17
Payer: MEDICARE

## 2023-10-17 NOTE — TELEPHONE ENCOUNTER
----- Message from Destini Venegas sent at 10/17/2023 10:15 AM CDT -----  Contact: [phone]  Richelle Scott called to advise that they are unable to process the order for the pt's diabetic shoes due to providers NPI not being updated since 2017. She says it says the provider is a student and she would need someone to sign off  on the order to proceed. Please Advise

## 2023-10-18 ENCOUNTER — PATIENT MESSAGE (OUTPATIENT)
Dept: CARDIOLOGY | Facility: CLINIC | Age: 78
End: 2023-10-18
Payer: MEDICARE

## 2023-10-25 DIAGNOSIS — R19.5 POSITIVE COLORECTAL CANCER SCREENING USING COLOGUARD TEST: Primary | ICD-10-CM

## 2023-10-25 DIAGNOSIS — J43.9 PULMONARY EMPHYSEMA, UNSPECIFIED EMPHYSEMA TYPE: Primary | ICD-10-CM

## 2023-10-25 LAB — NONINV COLON CA DNA+OCC BLD SCRN STL QL: POSITIVE

## 2023-10-26 ENCOUNTER — TELEPHONE (OUTPATIENT)
Dept: PRIMARY CARE CLINIC | Facility: CLINIC | Age: 78
End: 2023-10-26
Payer: MEDICARE

## 2023-10-26 ENCOUNTER — TELEPHONE (OUTPATIENT)
Dept: PULMONOLOGY | Facility: CLINIC | Age: 78
End: 2023-10-26
Payer: MEDICARE

## 2023-10-26 NOTE — TELEPHONE ENCOUNTER
Patient notified we are working on updating the [facility] system.  Suggested he can see podiatry to get the shoes sooner.  Patient verbalized understanding

## 2023-10-26 NOTE — TELEPHONE ENCOUNTER
----- Message from Estee Tate sent at 10/26/2023  9:21 AM CDT -----  Contact: Ms. Cohen Phone# [phone]  Patient is returning a phone call.  Who left a message for the patient:   Does patient know what this is regarding:    Would you like a call back, or a response through your [facility] portal?:   Call  Comments:  Patients daughter Ms. Cohen said that she received a call on today.

## 2023-10-26 NOTE — TELEPHONE ENCOUNTER
Gave appt for Pulmonary on 11/1/2023    ----- Message from Yadi Ojeda sent at 10/26/2023  1:59 PM CDT -----  Pulmonary emphysema, unspecified emphysema type [J43.9] please call patient back to schedule appointment

## 2023-10-27 ENCOUNTER — PATIENT MESSAGE (OUTPATIENT)
Dept: GASTROENTEROLOGY | Facility: CLINIC | Age: 78
End: 2023-10-27
Payer: MEDICARE

## 2023-10-31 ENCOUNTER — TELEPHONE (OUTPATIENT)
Dept: PRIMARY CARE CLINIC | Facility: CLINIC | Age: 78
End: 2023-10-31
Payer: MEDICARE

## 2023-10-31 DIAGNOSIS — N40.1 BENIGN PROSTATIC HYPERPLASIA WITH URINARY OBSTRUCTION: ICD-10-CM

## 2023-10-31 DIAGNOSIS — N13.8 BENIGN PROSTATIC HYPERPLASIA WITH URINARY OBSTRUCTION: ICD-10-CM

## 2023-10-31 RX ORDER — TAMSULOSIN HYDROCHLORIDE 0.4 MG/1
CAPSULE ORAL
Qty: 90 CAPSULE | Refills: 3 | Status: SHIPPED | OUTPATIENT
Start: 2023-10-31

## 2023-10-31 NOTE — TELEPHONE ENCOUNTER
No care due was identified.  Health [facility] Embedded Care Due Messages. Reference number: [id number].   10/31/2023 2:23:03 PM CDT

## 2023-10-31 NOTE — TELEPHONE ENCOUNTER
----- Message from Otis Martinez MD sent at 10/31/2023  4:18 PM CDT -----  Regarding: plavix and colonoscopy    Patient can hold plavix for 7 days before his colonoscopy.    - SB      ----- Message -----  From: Liz Franco RN  Sent: 10/31/2023   4:12 PM CDT  To: Otis Martinez MD; Michelle [facility] Staff  Subject: clearance                                        Patient is scheduled for a colonoscopy, he is currently taking plavix, he needs a clearance and instructions on how take plavix pre and post colonoscopy

## 2023-10-31 NOTE — TELEPHONE ENCOUNTER
Refill Decision Note   Gavin Cohen  is requesting a refill authorization.  Brief Assessment and Rationale for Refill:  Approve     Medication Therapy Plan:         Comments:     Note composed:4:00 PM 10/31/2023             Appointments     Last Visit   9/21/2023 Otis Martinez MD   Next Visit   12/11/2023 Otis Martinez MD

## 2023-10-31 NOTE — TELEPHONE ENCOUNTER
----- Message from Liz Franco RN sent at 10/31/2023  4:10 PM CDT -----  Regarding: clearance  Patient is scheduled for a colonoscopy, he is currently taking plavix, he needs a clearance and instructions on how take plavix pre and post colonoscopy

## 2023-11-02 DIAGNOSIS — N40.1 BENIGN PROSTATIC HYPERPLASIA WITH URINARY OBSTRUCTION: ICD-10-CM

## 2023-11-02 DIAGNOSIS — N13.8 BENIGN PROSTATIC HYPERPLASIA WITH URINARY OBSTRUCTION: ICD-10-CM

## 2023-11-02 RX ORDER — FINASTERIDE 5 MG/1
5 TABLET, FILM COATED ORAL DAILY
Qty: 90 TABLET | Refills: 3 | Status: SHIPPED | OUTPATIENT
Start: 2023-11-02

## 2023-11-02 NOTE — TELEPHONE ENCOUNTER
Refill Decision Note   Gavin Cohen  is requesting a refill authorization.  Brief Assessment and Rationale for Refill:  Approve     Medication Therapy Plan:         Comments:     Note composed:6:54 PM 11/02/2023

## 2023-11-02 NOTE — TELEPHONE ENCOUNTER
No care due was identified.  Health [facility] Embedded Care Due Messages. Reference number: [id number].   11/02/2023 12:58:49 PM CDT

## 2023-12-21 ENCOUNTER — TELEPHONE (OUTPATIENT)
Dept: PRIMARY CARE CLINIC | Facility: CLINIC | Age: 78
End: 2023-12-21
Payer: MEDICARE

## 2023-12-21 NOTE — TELEPHONE ENCOUNTER
Received a letter from [employer].  It states that Levemir will not be covered after 12/31/23.  Alternative formulary drugs are Tresiba, & Basaglar

## 2023-12-22 RX ORDER — INSULIN GLARGINE 100 [IU]/ML
INJECTION, SOLUTION SUBCUTANEOUS
Qty: 5 EACH | Refills: 0 | Status: SHIPPED | OUTPATIENT
Start: 2023-12-22 | End: 2024-01-26 | Stop reason: SDUPTHER

## 2023-12-22 NOTE — TELEPHONE ENCOUNTER
I have sent in Basaglar at the same dosage.  Five pens should last him 3 months.  Follow-up as planned.

## 2024-01-03 ENCOUNTER — DOCUMENTATION ONLY (OUTPATIENT)
Dept: SURGERY | Facility: CLINIC | Age: 79
End: 2024-01-03

## 2024-01-03 DIAGNOSIS — Z71.89 COMPLEX CARE COORDINATION: ICD-10-CM

## 2024-01-03 NOTE — PROGRESS NOTES
REENA Hill Janemarie P., MA; Emanuel Jauregui LPN  Cc: Brooke Fuller, CST  Caller: Unspecified (Today, 11:46 AM)  I spoke with patients daughter in law, she stated that patient does not want to have colonoscopy and that if she can convince him to have it in the future she will call and reschedule it.

## 2024-01-11 DIAGNOSIS — Z00.00 ENCOUNTER FOR MEDICARE ANNUAL WELLNESS EXAM: ICD-10-CM

## 2024-01-15 DIAGNOSIS — E11.22 TYPE 2 DIABETES MELLITUS WITH STAGE 3B CHRONIC KIDNEY DISEASE, WITH LONG-TERM CURRENT USE OF INSULIN: ICD-10-CM

## 2024-01-15 DIAGNOSIS — Z79.4 TYPE 2 DIABETES MELLITUS WITH STAGE 3B CHRONIC KIDNEY DISEASE, WITH LONG-TERM CURRENT USE OF INSULIN: ICD-10-CM

## 2024-01-15 DIAGNOSIS — N18.32 TYPE 2 DIABETES MELLITUS WITH STAGE 3B CHRONIC KIDNEY DISEASE, WITH LONG-TERM CURRENT USE OF INSULIN: ICD-10-CM

## 2024-01-15 NOTE — TELEPHONE ENCOUNTER
No care due was identified.  Health [facility] Embedded Care Due Messages. Reference number: [id number].   1/15/2024 12:05:37 PM CST

## 2024-01-16 RX ORDER — PEN NEEDLE, DIABETIC 32GX 5/32"
NEEDLE, DISPOSABLE MISCELLANEOUS
Qty: 100 EACH | Refills: 3 | Status: SHIPPED | OUTPATIENT
Start: 2024-01-16

## 2024-01-16 RX ORDER — INSULIN DETEMIR 100 [IU]/ML
INJECTION, SOLUTION SUBCUTANEOUS
Qty: 15 ML | Refills: 2 | OUTPATIENT
Start: 2024-01-16

## 2024-01-17 NOTE — TELEPHONE ENCOUNTER
Refill Decision Note   Gavin Cohen  is requesting a refill authorization.  Brief Assessment and Rationale for Refill:  Quick Discontinue  Approve     Medication Therapy Plan:  Levemir: Therapy switched to Basaglar 12/22/23      Comments:     Note composed:6:35 PM 01/16/2024

## 2024-01-26 ENCOUNTER — TELEPHONE (OUTPATIENT)
Dept: SURGERY | Facility: CLINIC | Age: 79
End: 2024-01-26
Payer: MEDICARE

## 2024-01-26 ENCOUNTER — OFFICE VISIT (OUTPATIENT)
Dept: PRIMARY CARE CLINIC | Facility: CLINIC | Age: 79
End: 2024-01-26
Payer: MEDICARE

## 2024-01-26 VITALS
HEART RATE: 84 BPM | WEIGHT: 197.06 LBS | RESPIRATION RATE: 18 BRPM | OXYGEN SATURATION: 95 % | HEIGHT: 68 IN | DIASTOLIC BLOOD PRESSURE: 76 MMHG | BODY MASS INDEX: 29.86 KG/M2 | SYSTOLIC BLOOD PRESSURE: 138 MMHG

## 2024-01-26 DIAGNOSIS — D69.6 THROMBOCYTOPENIA, UNSPECIFIED: ICD-10-CM

## 2024-01-26 DIAGNOSIS — N18.32 TYPE 2 DIABETES MELLITUS WITH STAGE 3B CHRONIC KIDNEY DISEASE, WITH LONG-TERM CURRENT USE OF INSULIN: ICD-10-CM

## 2024-01-26 DIAGNOSIS — E87.5 SERUM POTASSIUM ELEVATED: ICD-10-CM

## 2024-01-26 DIAGNOSIS — J43.9 PULMONARY EMPHYSEMA, UNSPECIFIED EMPHYSEMA TYPE: ICD-10-CM

## 2024-01-26 DIAGNOSIS — J43.1 PANLOBULAR EMPHYSEMA: ICD-10-CM

## 2024-01-26 DIAGNOSIS — Z00.00 ANNUAL PHYSICAL EXAM: Primary | ICD-10-CM

## 2024-01-26 DIAGNOSIS — F32.1 CURRENT MODERATE EPISODE OF MAJOR DEPRESSIVE DISORDER WITHOUT PRIOR EPISODE: ICD-10-CM

## 2024-01-26 DIAGNOSIS — Z79.4 TYPE 2 DIABETES MELLITUS WITH STAGE 3B CHRONIC KIDNEY DISEASE, WITH LONG-TERM CURRENT USE OF INSULIN: ICD-10-CM

## 2024-01-26 DIAGNOSIS — R19.5 POSITIVE COLORECTAL CANCER SCREENING USING COLOGUARD TEST: ICD-10-CM

## 2024-01-26 DIAGNOSIS — E11.22 TYPE 2 DIABETES MELLITUS WITH STAGE 3B CHRONIC KIDNEY DISEASE, WITH LONG-TERM CURRENT USE OF INSULIN: ICD-10-CM

## 2024-01-26 DIAGNOSIS — N18.32 STAGE 3B CHRONIC KIDNEY DISEASE: ICD-10-CM

## 2024-01-26 DIAGNOSIS — I70.234 ATHEROSCLEROSIS OF NATIVE ARTERIES OF RIGHT LEG WITH ULCERATION OF HEEL AND MIDFOOT: ICD-10-CM

## 2024-01-26 DIAGNOSIS — E74.819 DISORDERS OF GLUCOSE TRANSPORT, UNSPECIFIED: ICD-10-CM

## 2024-01-26 DIAGNOSIS — F03.90 DEMENTIA WITHOUT BEHAVIORAL DISTURBANCE: ICD-10-CM

## 2024-01-26 PROBLEM — N18.4 CKD (CHRONIC KIDNEY DISEASE) STAGE 4, GFR 15-29 ML/MIN: Status: RESOLVED | Noted: 2020-09-16 | Resolved: 2024-01-26

## 2024-01-26 PROCEDURE — 99214 OFFICE O/P EST MOD 30 MIN: CPT | Mod: S$PBB,,, | Performed by: STUDENT IN AN ORGANIZED HEALTH CARE EDUCATION/TRAINING PROGRAM

## 2024-01-26 PROCEDURE — 99999 PR PBB SHADOW E&M-EST. PATIENT-LVL V: CPT | Mod: PBBFAC,,, | Performed by: STUDENT IN AN ORGANIZED HEALTH CARE EDUCATION/TRAINING PROGRAM

## 2024-01-26 PROCEDURE — 99215 OFFICE O/P EST HI 40 MIN: CPT | Mod: PBBFAC,PN | Performed by: STUDENT IN AN ORGANIZED HEALTH CARE EDUCATION/TRAINING PROGRAM

## 2024-01-26 RX ORDER — INSULIN GLARGINE 100 [IU]/ML
INJECTION, SOLUTION SUBCUTANEOUS
Qty: 5 EACH | Refills: 3 | Status: SHIPPED | OUTPATIENT
Start: 2024-01-26

## 2024-01-26 NOTE — PROGRESS NOTES
Subjective:           Patient ID: Gavin Cohen Sr. is a 78 y.o. male who presents today with a chief complaint of Shortness of Breath and Follow-up  .    Chief Complaint:   Shortness of Breath and Follow-up      History of Present Illness:    Gavin Cohen Sr. is a 78 y.o. male who presents today with a chief complaint of Shortness of Breath and Follow-up  .      Patient presents to appointment with his daughter present.  He is having some mild difficulty following the medical discussion about his care today.  Including concerns about emphysema/COPD, has diabetes control and medication refills.  All these were discussed with his approval in presence daughter who clarified certain points.    CT scan was reviewed with patient and daughter showing emphysema and COPD.  Look that the images including significant emphysematous change to the right lung.  He is using albuterol inhaler nightly, is in agreement to get a control medication which will be ordered.    Patient had positive Cologuard, would recommend to get colonoscopy, but does not want to have this done at this time.  Referral to GI was discussed and can but to alternative means to evaluate for possible colon cancer.    Medications were all reviewed today, refills are present for all medicines until September of 2024.    Diabetes:  Patient's last A1c was under 8%, thus was told his in moderate control range.  Uses insulin, 15 units at night, increases to 25 units or 30 units when blood sugars more elevated, or cuts down to 8 units when blood sugar is lower.  States his morning glucoses are frequently between 95 and 130, which seems well controlled.      Review of Systems   Constitutional:  Positive for fatigue. Negative for fever.   HENT: Negative.  Negative for congestion and sinus pressure.    Eyes: Negative.    Respiratory:  Positive for choking, shortness of breath and wheezing.    Cardiovascular:  Positive for leg swelling. Negative for chest pain and  "palpitations.   Gastrointestinal: Negative.  Negative for constipation, diarrhea, nausea and vomiting.   Endocrine: Negative.    Genitourinary:  Positive for difficulty urinating and urgency. Negative for dysuria and frequency.   Musculoskeletal:  Positive for joint swelling.   Skin:  Positive for color change.   Allergic/Immunologic: Negative for food allergies.   Neurological:  Positive for weakness.   Psychiatric/Behavioral:  Positive for sleep disturbance.            Objective:        Vitals:    01/26/24 1153   BP: 138/76   BP Location: Left arm   Patient Position: Sitting   BP Method: Medium (Automatic)   Pulse: 84   Resp: 18   SpO2: 95%   Weight: 89.4 kg (197 lb 1.5 oz)   Height: 5' 8" (1.727 m)       Body mass index is 29.97 kg/m².      Physical Exam  Vitals reviewed. Exam conducted with a chaperone present.   Constitutional:       Appearance: Normal appearance. He is not toxic-appearing.      Comments: As per BMI.  Wheelchair-bound patient with a right-sided BKA.  Patient escorted by his daughter to appointment.   HENT:      Head: Normocephalic and atraumatic.      Right Ear: External ear normal.      Left Ear: External ear normal.   Eyes:      Extraocular Movements: Extraocular movements intact.      Conjunctiva/sclera: Conjunctivae normal.   Cardiovascular:      Rate and Rhythm: Normal rate and regular rhythm.      Pulses: Normal pulses.   Pulmonary:      Effort: Pulmonary effort is normal.      Breath sounds: No rhonchi.   Abdominal:      General: Bowel sounds are normal.      Palpations: Abdomen is soft.      Tenderness: There is no right CVA tenderness or left CVA tenderness.   Musculoskeletal:         General: No swelling.      Cervical back: Normal range of motion.      Left lower leg: No edema.      Comments: Right lower extremity is and prosthesis.    Has plastic molded cup holding distal stump.    Patient reports having difficulty with his shoes currently, is not stable in his feet.  Would need to " get replacement for his diabetic shoes if possible.   Skin:     General: Skin is warm.      Capillary Refill: Capillary refill takes less than 2 seconds.      Coloration: Skin is not jaundiced.      Comments: Diabetic foot exam was completed, patient is missing right foot, left foot is missing 1st and 2nd digits.    Pulse intact.  Sensation intact.    Neurological:      General: No focal deficit present.      Mental Status: He is alert and oriented to person, place, and time.      Motor: Weakness present.      Gait: Gait abnormal.   Psychiatric:         Mood and Affect: Mood normal.             Lab Results   Component Value Date     10/04/2023    K 5.4 (H) 10/04/2023     (H) 10/04/2023    CO2 17 (L) 10/04/2023    BUN 51 (H) 10/04/2023    CREATININE 2.0 (H) 10/04/2023    ANIONGAP 10 10/04/2023     Lab Results   Component Value Date    HGBA1C 7.7 (H) 10/04/2023     Lab Results   Component Value Date    BNP 66 09/23/2020       Lab Results   Component Value Date    WBC 7.06 10/04/2023    HGB 11.0 (L) 10/04/2023    HCT 34.2 (L) 10/04/2023     (L) 10/04/2023    GRAN 4.4 10/04/2023    GRAN 61.7 10/04/2023     Lab Results   Component Value Date    CHOL 123 10/04/2023    HDL 39 (L) 10/04/2023    LDLCALC 68.0 10/04/2023    TRIG 80 10/04/2023          Current Outpatient Medications:     albuterol (PROVENTIL/VENTOLIN HFA) 90 mcg/actuation inhaler, Inhale 2 puffs into the lungs every 4 (four) hours as needed for Wheezing. Rescue, Disp: 54 g, Rfl: 3    aspirin (ECOTRIN) 81 MG EC tablet, Take 1 tablet (81 mg total) by mouth once daily., Disp: 90 tablet, Rfl: 0    citalopram (CELEXA) 20 MG tablet, Take 1 tablet (20 mg total) by mouth once daily., Disp: 90 tablet, Rfl: 3    clopidogreL (PLAVIX) 75 mg tablet, Take 1 tablet (75 mg total) by mouth once daily., Disp: 90 tablet, Rfl: 3    finasteride (PROSCAR) 5 mg tablet, TAKE 1 TABLET (5 MG TOTAL) BY MOUTH ONCE DAILY., Disp: 90 tablet, Rfl: 3    gabapentin  "(NEURONTIN) 100 MG capsule, Take 1 tab orally per day as needed for leg pain or phantom limb pain., Disp: 90 capsule, Rfl: 3    losartan (COZAAR) 25 MG tablet, Take 0.5 tablets (12.5 mg total) by mouth once daily., Disp: 90 tablet, Rfl: 3    nitroGLYCERIN (NITROSTAT) 0.4 MG SL tablet, Place 1 tablet (0.4 mg total) under the tongue every 5 (five) minutes as needed for Chest pain., Disp: 15 tablet, Rfl: 0    oxyCODONE (ROXICODONE) 30 MG Tab, Take 30 mg by mouth every 8 (eight) hours as needed., Disp: , Rfl:     pravastatin (PRAVACHOL) 40 MG tablet, Take 1 tablet (40 mg total) by mouth once daily., Disp: 90 tablet, Rfl: 3    QUEtiapine (SEROQUEL) 25 MG Tab, Take 1 tablet (25 mg total) by mouth nightly., Disp: 90 tablet, Rfl: 3    tamsulosin (FLOMAX) 0.4 mg Cap, TAKE ONE CAPSULE BY MOUTH EVERY DAY FOR PROSTATE, Disp: 90 capsule, Rfl: 3    TRUE METRIX GLUCOSE TEST STRIP Strp, TEST FOR BLOOD SUGAR THREE TIMES DAILY, Disp: 300 strip, Rfl: 3    UNIFINE PENTIPS 32 gauge x 5/32" Ndle, USE WITH INSULIN, Disp: 100 each, Rfl: 3    insulin (BASAGLAR KWIKPEN U-100 INSULIN) glargine 100 units/mL SubQ pen, Take 15u nightly, can add 10u if sugar over 200, or add 15u if sugar over 250. If sugar under 100, take 8u., Disp: 5 each, Rfl: 3    mometasone-formoterol (DULERA) 200-5 mcg/actuation inhaler, Inhale 2 puffs into the lungs 2 (two) times daily. Controller, Disp: 8.8 g, Rfl: 11     Outpatient Encounter Medications as of 1/26/2024   Medication Sig Dispense Refill    albuterol (PROVENTIL/VENTOLIN HFA) 90 mcg/actuation inhaler Inhale 2 puffs into the lungs every 4 (four) hours as needed for Wheezing. Rescue 54 g 3    aspirin (ECOTRIN) 81 MG EC tablet Take 1 tablet (81 mg total) by mouth once daily. 90 tablet 0    citalopram (CELEXA) 20 MG tablet Take 1 tablet (20 mg total) by mouth once daily. 90 tablet 3    clopidogreL (PLAVIX) 75 mg tablet Take 1 tablet (75 mg total) by mouth once daily. 90 tablet 3    finasteride (PROSCAR) 5 mg " "tablet TAKE 1 TABLET (5 MG TOTAL) BY MOUTH ONCE DAILY. 90 tablet 3    gabapentin (NEURONTIN) 100 MG capsule Take 1 tab orally per day as needed for leg pain or phantom limb pain. 90 capsule 3    losartan (COZAAR) 25 MG tablet Take 0.5 tablets (12.5 mg total) by mouth once daily. 90 tablet 3    nitroGLYCERIN (NITROSTAT) 0.4 MG SL tablet Place 1 tablet (0.4 mg total) under the tongue every 5 (five) minutes as needed for Chest pain. 15 tablet 0    oxyCODONE (ROXICODONE) 30 MG Tab Take 30 mg by mouth every 8 (eight) hours as needed.      pravastatin (PRAVACHOL) 40 MG tablet Take 1 tablet (40 mg total) by mouth once daily. 90 tablet 3    QUEtiapine (SEROQUEL) 25 MG Tab Take 1 tablet (25 mg total) by mouth nightly. 90 tablet 3    tamsulosin (FLOMAX) 0.4 mg Cap TAKE ONE CAPSULE BY MOUTH EVERY DAY FOR PROSTATE 90 capsule 3    TRUE METRIX GLUCOSE TEST STRIP Strp TEST FOR BLOOD SUGAR THREE TIMES DAILY 300 strip 3    UNIFINE PENTIPS 32 gauge x 5/32" Ndle USE WITH INSULIN 100 each 3    insulin (BASAGLAR KWIKPEN U-100 INSULIN) glargine 100 units/mL SubQ pen Take 15u nightly, can add 10u if sugar over 200, or add 15u if sugar over 250. If sugar under 100, take 8u. 5 each 3    mometasone-formoterol (DULERA) 200-5 mcg/actuation inhaler Inhale 2 puffs into the lungs 2 (two) times daily. Controller 8.8 g 11    [DISCONTINUED] insulin (BASAGLAR KWIKPEN U-100 INSULIN) glargine 100 units/mL SubQ pen Take 15u nightly, can add 10u if sugar over 200, or add 15u if sugar over 250. If sugar under 100, take 8u. 5 each 0     No facility-administered encounter medications on file as of 1/26/2024.          Assessment:       1. Annual physical exam    2. Pulmonary emphysema, unspecified emphysema type    3. Type 2 diabetes mellitus with stage 3b chronic kidney disease, with long-term current use of insulin    4. Panlobular emphysema    5. Stage 3b chronic kidney disease    6. Serum potassium elevated    7. Positive colorectal cancer screening " using Cologuard test    8. 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 Disorders of glucose transport, unspecified    10. Thrombocytopenia, unspecified    11. Dementia without behavioral disturbance    12. Current moderate episode of major depressive disorder without prior episode           Plan:       Annual physical exam    Pulmonary emphysema, unspecified emphysema type  -     mometasone-formoterol (DULERA) 200-5 mcg/actuation inhaler; Inhale 2 puffs into the lungs 2 (two) times daily. Controller  Dispense: 8.8 g; Refill: 11    Type 2 diabetes mellitus with stage 3b chronic kidney disease, with long-term current use of insulin  -     insulin (BASAGLAR KWIKPEN U-100 INSULIN) glargine 100 units/mL SubQ pen; Take 15u nightly, can add 10u if sugar over 200, or add 15u if sugar over 250. If sugar under 100, take 8u.  Dispense: 5 each; Refill: 3  -     Basic Metabolic Panel; Future; Expected date: 01/26/2024    Panlobular emphysema  -     mometasone-formoterol (DULERA) 200-5 mcg/actuation inhaler; Inhale 2 puffs into the lungs 2 (two) times daily. 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 Controller  Dispense: 8.8 g; Refill: 11   - CT scan was reviewed with patient and daughter showing emphysema and COPD.  Look that the images including significant emphysematous change to the right lung.  He is using albuterol inhaler nightly, is in agreement to get a control medication which will be ordered.    Type 2 diabetes mellitus with stage 3b chronic kidney disease, with long-term current use of insulin  -     insulin (BASAGLAR KWIKPEN U-100 INSULIN) glargine 100 units/mL SubQ pen; Take 15u nightly, can add 10u if sugar over 200, or add 15u if sugar over 250. If sugar under 100, take 8u.  Dispense: 5 each; Refill: 3  -     Basic Metabolic Panel; Future; Expected date: 01/26/2024   -  Patient's last A1c was under 8%, thus was told his in moderate control range.  Uses insulin, 15 units at night, increases to 25 units or 30 units when blood sugars more elevated, or cuts down to 8 units when blood sugar is lower.  States his morning glucoses are frequently between 95 and 130, which seems well controlled.    Panlobular emphysema  -     mometasone-formoterol (DULERA) 200-5 mcg/actuation inhaler; Inhale 2 puffs into the lungs 2 (two) times daily. Controller  Dispense: 8.8 g; Refill: 11    Stage 3b chronic kidney disease  -     Basic Metabolic Panel; Future; Expected date: 01/26/2024    Serum potassium elevated  -     Basic Metabolic Panel; Future; Expected date: 01/26/2024    Positive colorectal cancer screening using Cologuard test  -     Ambulatory referral/consult to Gastroenterology; Future; Expected date: 02/02/2024   - Patient had positive Cologuard, would recommend to get colonoscopy, but does not want to have this done at this time.  Referral to GI was discussed and can but to alternative means to evaluate for possible colon cancer.

## 2024-01-26 NOTE — TELEPHONE ENCOUNTER
Called pt's daughter listed in his chart re: + cologuard test.  RN left office number for pt's daughter to give us a call back.

## 2024-01-26 NOTE — PATIENT INSTRUCTIONS
Pulmonary emphysema, unspecified emphysema type  -     mometasone-formoterol (DULERA) 200-5 mcg/actuation inhaler; Inhale 2 puffs into the lungs 2 (two) times daily. Controller  Dispense: 8.8 g; Refill: 11   - CT scan was reviewed with patient and daughter showing emphysema and COPD.  Look that the images including significant emphysematous change to the right lung.  He is using albuterol inhaler nightly, is in agreement to get a control medication which will be ordered.    Type 2 diabetes mellitus with stage 3b chronic kidney disease, with long-term current use of insulin  -     insulin (BASAGLAR KWIKPEN U-100 INSULIN) glargine 100 units/mL SubQ pen; Take 15u nightly, can add 10u if sugar over 200, or add 15u if sugar over 250. If sugar under 100, take 8u.  Dispense: 5 each; Refill: 3  -     Basic Metabolic Panel; Future; Expected date: 01/26/2024   -  Patient's last A1c was under 8%, thus was told his in moderate control range.  Uses insulin, 15 units at night, increases to 25 units or 30 units when blood sugars more elevated, or cuts down to 8 units when blood sugar is lower.  States his morning glucoses are frequently between 95 and 130, which seems well controlled.    Panlobular emphysema  -     mometasone-formoterol (DULERA) 200-5 mcg/actuation inhaler; Inhale 2 puffs into the lungs 2 (two) times daily. Controller  Dispense: 8.8 g; Refill: 11    Stage 3b chronic kidney disease  -     Basic Metabolic Panel; Future; Expected date: 01/26/2024    Serum potassium elevated  -     Basic Metabolic Panel; Future; Expected date: 01/26/2024    Positive colorectal cancer screening using Cologuard test  -     Ambulatory referral/consult to Gastroenterology; Future; Expected date: 02/02/2024   - Patient had positive Cologuard, would recommend to get colonoscopy, but does not want to have this done at this time.  Referral to GI was discussed and can but to alternative means to evaluate for possible colon cancer.

## 2024-02-05 DIAGNOSIS — F32.1 CURRENT MODERATE EPISODE OF MAJOR DEPRESSIVE DISORDER WITHOUT PRIOR EPISODE: ICD-10-CM

## 2024-02-05 RX ORDER — CITALOPRAM 20 MG/1
20 TABLET, FILM COATED ORAL DAILY
Qty: 90 TABLET | Refills: 3 | Status: SHIPPED | OUTPATIENT
Start: 2024-02-05

## 2024-02-05 NOTE — TELEPHONE ENCOUNTER
Provider Staff:  Action required for this patient    Requires labs      Please see care gap opportunities below in Care Due Message.    Thanks!  [facility]     Appointments      Date Provider   Last Visit   1/26/2024 Otis Martinez MD   Next Visit   7/29/2024 Otis Martinez MD     Refill Decision Note   Gavin Cohen  is requesting a refill authorization.  Brief Assessment and Rationale for Refill:  Approve     Medication Therapy Plan:  FOVS      Comments:     Note composed:9:57 AM 02/05/2024

## 2024-02-05 NOTE — TELEPHONE ENCOUNTER
Care Due:                  Date            Visit Type   Department     Provider  --------------------------------------------------------------------------------                                EP -                              PRIMARY [facility]  Last Visit: 01-      CARE ([facility])   PRIMARY CARE   Otis Martinez                              [facility]  Next Visit: 07-      CARE ([facility])   PRIMARY CARE   Otis Martinez                                                            Last  Test          Frequency    Reason                     Performed    Due Date  --------------------------------------------------------------------------------    HBA1C.......  6 months...  insulin..................  10-   04-    Health [facility] Embedded Care Due Messages. Reference number: [id number].   2/05/2024 9:39:48 AM CST

## 2024-02-20 ENCOUNTER — TELEPHONE (OUTPATIENT)
Dept: PRIMARY CARE CLINIC | Facility: CLINIC | Age: 79
End: 2024-02-20
Payer: MEDICARE

## 2024-02-20 NOTE — TELEPHONE ENCOUNTER
----- Message from Ananya Guajardo sent at 2024 11:25 AM CST -----  Contact: [facility]/Irma/ [phone]  Irma called, in regards death certificate that was place in [facility], please if that can be f/u ASAP because is for cremation. Thank you.

## 2024-02-20 NOTE — TELEPHONE ENCOUNTER
----- Message from Ananya Guajardo sent at 2024 11:25 AM CST -----  Contact: [facility]/Irma/ [phone]  Irma called, in regards death certificate that was place in [facility], please if that can be f/u ASAP because is for cremation. Thank you.

## 2024-02-22 NOTE — TELEPHONE ENCOUNTER
Spoke with Irma at the  home.  She states they are waiting on the patients social security number to complete it in [facility].  She will contact me once that is done

## 2024-02-26 ENCOUNTER — TELEPHONE (OUTPATIENT)
Dept: PRIMARY CARE CLINIC | Facility: CLINIC | Age: 79
End: 2024-02-26
Payer: MEDICARE

## 2024-02-26 NOTE — TELEPHONE ENCOUNTER
Spoke with Irma.  She gave me the date and time of death 2/13/24 at 11:22.  She also gave me the number to the coroners office in case I need it [phone].

## 2024-02-26 NOTE — TELEPHONE ENCOUNTER
----- Message from Ananya Guajardo sent at 2024  9:45 AM CST -----  Contact: [facility]/ Irma/[phone]  The death certificate is in [place] and is for cremation, and have not info on cause of death. Spoke to jj and was unable to get a cause of passing. Thank you

## 2024-02-27 NOTE — TELEPHONE ENCOUNTER
Got request to complete [facility] death certificate for patient sent to inbox today.    MA called  home and verified patient  at home, at 24 at 11:22AM.    Did not have further information.      Will need to call family to list a cause of death as do not have adequate information.     Called and spoke with daughter, Carolyn Au on 24 to inquire about history of his death:    Daughter states that patient  in his home on the morning of , 2024.  He had been having frequent coughing fits the day before his passing, and gone to bed while coughing significantly.  His nephew and nephew's fiancee were staying over with him to keep an eye on him.    In the morning they had gotten up and found him cold and not breathing in bed.  They called daughter then called 911.  Corner arrived and pronounced the patient dead at 11:22 a.m..    Daughter states it did not appear he struggled, was peaceful appearing.    [facility] death certificate was filled out at this time.     Dr. Otis Martinez